# Patient Record
Sex: FEMALE | Race: WHITE | Employment: OTHER | URBAN - METROPOLITAN AREA
[De-identification: names, ages, dates, MRNs, and addresses within clinical notes are randomized per-mention and may not be internally consistent; named-entity substitution may affect disease eponyms.]

---

## 2017-03-21 PROCEDURE — 88175 CYTOPATH C/V AUTO FLUID REDO: CPT | Performed by: INTERNAL MEDICINE

## 2017-06-07 PROBLEM — E66.01 SEVERE OBESITY (BMI 35.0-39.9) WITH COMORBIDITY (HCC): Status: ACTIVE | Noted: 2017-06-07

## 2018-02-08 PROBLEM — E66.01 SEVERE OBESITY (BMI 35.0-39.9) WITH COMORBIDITY (HCC): Status: RESOLVED | Noted: 2017-06-07 | Resolved: 2018-02-08

## 2018-05-25 PROCEDURE — 88305 TISSUE EXAM BY PATHOLOGIST: CPT | Performed by: INTERNAL MEDICINE

## 2018-06-26 PROCEDURE — 87045 FECES CULTURE AEROBIC BACT: CPT | Performed by: INTERNAL MEDICINE

## 2018-06-26 PROCEDURE — 87427 SHIGA-LIKE TOXIN AG IA: CPT | Performed by: INTERNAL MEDICINE

## 2018-06-26 PROCEDURE — 87329 GIARDIA AG IA: CPT | Performed by: INTERNAL MEDICINE

## 2018-06-26 PROCEDURE — 87177 OVA AND PARASITES SMEARS: CPT | Performed by: INTERNAL MEDICINE

## 2018-06-26 PROCEDURE — 87209 SMEAR COMPLEX STAIN: CPT | Performed by: INTERNAL MEDICINE

## 2018-06-26 PROCEDURE — 87272 CRYPTOSPORIDIUM AG IF: CPT | Performed by: INTERNAL MEDICINE

## 2018-06-26 PROCEDURE — 87046 STOOL CULTR AEROBIC BACT EA: CPT | Performed by: INTERNAL MEDICINE

## 2018-07-03 PROCEDURE — 82705 FATS/LIPIDS FECES QUAL: CPT | Performed by: INTERNAL MEDICINE

## 2018-07-03 PROCEDURE — 87493 C DIFF AMPLIFIED PROBE: CPT | Performed by: INTERNAL MEDICINE

## 2018-07-03 PROCEDURE — 83993 ASSAY FOR CALPROTECTIN FECAL: CPT | Performed by: INTERNAL MEDICINE

## 2018-07-03 PROCEDURE — 82656 EL-1 FECAL QUAL/SEMIQ: CPT | Performed by: INTERNAL MEDICINE

## 2018-08-07 PROCEDURE — 83516 IMMUNOASSAY NONANTIBODY: CPT | Performed by: INTERNAL MEDICINE

## 2018-08-07 PROCEDURE — 82784 ASSAY IGA/IGD/IGG/IGM EACH: CPT | Performed by: INTERNAL MEDICINE

## 2018-08-07 PROCEDURE — 86256 FLUORESCENT ANTIBODY TITER: CPT | Performed by: INTERNAL MEDICINE

## 2018-08-07 PROCEDURE — 82607 VITAMIN B-12: CPT | Performed by: INTERNAL MEDICINE

## 2018-08-08 PROCEDURE — 87329 GIARDIA AG IA: CPT | Performed by: INTERNAL MEDICINE

## 2018-08-08 PROCEDURE — 87015 SPECIMEN INFECT AGNT CONCNTJ: CPT | Performed by: INTERNAL MEDICINE

## 2018-08-08 PROCEDURE — 87207 SMEAR SPECIAL STAIN: CPT | Performed by: INTERNAL MEDICINE

## 2018-08-08 PROCEDURE — 87177 OVA AND PARASITES SMEARS: CPT | Performed by: INTERNAL MEDICINE

## 2018-08-08 PROCEDURE — 36415 COLL VENOUS BLD VENIPUNCTURE: CPT | Performed by: INTERNAL MEDICINE

## 2018-08-08 PROCEDURE — 87272 CRYPTOSPORIDIUM AG IF: CPT | Performed by: INTERNAL MEDICINE

## 2018-08-08 PROCEDURE — 82705 FATS/LIPIDS FECES QUAL: CPT | Performed by: INTERNAL MEDICINE

## 2018-08-08 PROCEDURE — 87209 SMEAR COMPLEX STAIN: CPT | Performed by: INTERNAL MEDICINE

## 2018-08-08 PROCEDURE — 82656 EL-1 FECAL QUAL/SEMIQ: CPT | Performed by: INTERNAL MEDICINE

## 2018-09-08 PROCEDURE — 88305 TISSUE EXAM BY PATHOLOGIST: CPT | Performed by: INTERNAL MEDICINE

## 2018-10-18 PROBLEM — K52.831 COLLAGENOUS COLITIS: Status: ACTIVE | Noted: 2018-10-18

## 2019-01-14 PROCEDURE — 87086 URINE CULTURE/COLONY COUNT: CPT | Performed by: EMERGENCY MEDICINE

## 2019-02-14 PROBLEM — I70.0 AORTIC ATHEROSCLEROSIS (HCC): Status: ACTIVE | Noted: 2019-02-14

## 2020-08-27 NOTE — H&P
[facility]  Orthopedic Surgery  HISTORY AND PHYSICAL EXAMINATION    Patient: [address] [facility] Record Number: [id number]    CHIEF COMPLAINT: Right knee pain    HPI:   Cely Joey is a 77year old female followed in the office, started with disabi weight  MUSCULOSKELETAL: no atrophy, no spasticity and as per HPI  SKIN: denies any unusual skin lesions or rashes  NEURO: denies numbness, tingling, or radiating pain  RESPIRATORY: No shortness of breath.    PSYCHIATRY: No depression or anxiety  All other

## 2020-09-03 NOTE — H&P
[facility]  Orthopedic Surgery  HISTORY AND PHYSICAL EXAMINATION    Patient: 3000 [facility] Record Number: [id number]    CHIEF COMPLAINT: Right knee pain    HPI:   Lucita is a 77year old female followed in the office, struggle with disab 2016        Years since quittin.4      Smokeless tobacco: Never Used      Tobacco comment: 5-10 cig/day, quit 2016    Alcohol use: No      Alcohol/week: 0.0 standard drinks    Drug use: No    Family History:  Family History   Problem Relation Age not a surgical procedure. Anya Gaffney MD

## 2020-09-05 ENCOUNTER — APPOINTMENT (OUTPATIENT)
Dept: LAB | Facility: HOSPITAL | Age: 75
End: 2020-09-05
Attending: ORTHOPAEDIC SURGERY
Payer: MEDICARE

## 2020-09-05 DIAGNOSIS — M17.11 PRIMARY OSTEOARTHRITIS OF RIGHT KNEE: ICD-10-CM

## 2020-09-07 LAB — SARS-COV-2 RNA RESP QL NAA+PROBE: NOT DETECTED

## 2020-09-08 ENCOUNTER — HOSPITAL ENCOUNTER (OUTPATIENT)
Facility: HOSPITAL | Age: 75
Discharge: HOME HEALTH | End: 2020-09-09
Attending: ORTHOPAEDIC SURGERY | Admitting: ORTHOPAEDIC SURGERY
Payer: MEDICARE

## 2020-09-08 ENCOUNTER — ANESTHESIA (OUTPATIENT)
Dept: SURGERY | Facility: HOSPITAL | Age: 75
End: 2020-09-08
Payer: MEDICARE

## 2020-09-08 ENCOUNTER — APPOINTMENT (OUTPATIENT)
Dept: GENERAL RADIOLOGY | Facility: HOSPITAL | Age: 75
End: 2020-09-08
Attending: ORTHOPAEDIC SURGERY
Payer: MEDICARE

## 2020-09-08 ENCOUNTER — ANESTHESIA EVENT (OUTPATIENT)
Dept: SURGERY | Facility: HOSPITAL | Age: 75
End: 2020-09-08
Payer: MEDICARE

## 2020-09-08 DIAGNOSIS — M17.11 PRIMARY OSTEOARTHRITIS OF RIGHT KNEE: Primary | ICD-10-CM

## 2020-09-08 PROBLEM — Z47.89 ORTHOPEDIC AFTERCARE: Status: ACTIVE | Noted: 2020-09-08

## 2020-09-08 LAB
ANTIBODY SCREEN: NEGATIVE
CREAT BLD-MCNC: 0.96 MG/DL (ref 0.55–1.02)
RH BLOOD TYPE: POSITIVE

## 2020-09-08 PROCEDURE — 86850 RBC ANTIBODY SCREEN: CPT

## 2020-09-08 PROCEDURE — 0SRC069 REPLACEMENT OF RIGHT KNEE JOINT WITH OXIDIZED ZIRCONIUM ON POLYETHYLENE SYNTHETIC SUBSTITUTE, CEMENTED, OPEN APPROACH: ICD-10-PCS | Performed by: ORTHOPAEDIC SURGERY

## 2020-09-08 PROCEDURE — 88305 TISSUE EXAM BY PATHOLOGIST: CPT | Performed by: ORTHOPAEDIC SURGERY

## 2020-09-08 PROCEDURE — 88311 DECALCIFY TISSUE: CPT | Performed by: ORTHOPAEDIC SURGERY

## 2020-09-08 PROCEDURE — 76942 ECHO GUIDE FOR BIOPSY: CPT | Performed by: ANESTHESIOLOGY

## 2020-09-08 PROCEDURE — 86900 BLOOD TYPING SEROLOGIC ABO: CPT

## 2020-09-08 PROCEDURE — 73560 X-RAY EXAM OF KNEE 1 OR 2: CPT | Performed by: ORTHOPAEDIC SURGERY

## 2020-09-08 PROCEDURE — 82565 ASSAY OF CREATININE: CPT | Performed by: ORTHOPAEDIC SURGERY

## 2020-09-08 PROCEDURE — 86901 BLOOD TYPING SEROLOGIC RH(D): CPT

## 2020-09-08 DEVICE — REFOBACIN BC R 1X40 US: Type: IMPLANTABLE DEVICE | Site: KNEE | Status: FUNCTIONAL

## 2020-09-08 DEVICE — PSN ALL POLY PAT PLY 32MM: Type: IMPLANTABLE DEVICE | Site: KNEE | Status: FUNCTIONAL

## 2020-09-08 DEVICE — PSN STR HYB ST 14X+30 M: Type: IMPLANTABLE DEVICE | Site: KNEE | Status: FUNCTIONAL

## 2020-09-08 DEVICE — PSN TIB STM 5 DEG SZ E R: Type: IMPLANTABLE DEVICE | Site: KNEE | Status: FUNCTIONAL

## 2020-09-08 DEVICE — PSNA CM FM/CM TIB/VE: Type: IMPLANTABLE DEVICE | Status: FUNCTIONAL

## 2020-09-08 RX ORDER — OXYCODONE HYDROCHLORIDE 15 MG/1
15 TABLET ORAL EVERY 4 HOURS PRN
Status: DISCONTINUED | OUTPATIENT
Start: 2020-09-08 | End: 2020-09-09

## 2020-09-08 RX ORDER — MELATONIN
325
Status: DISCONTINUED | OUTPATIENT
Start: 2020-09-09 | End: 2020-09-09

## 2020-09-08 RX ORDER — OXYCODONE HYDROCHLORIDE 10 MG/1
10 TABLET ORAL EVERY 4 HOURS PRN
Status: DISCONTINUED | OUTPATIENT
Start: 2020-09-08 | End: 2020-09-09

## 2020-09-08 RX ORDER — TIZANIDINE 2 MG/1
2 TABLET ORAL 3 TIMES DAILY PRN
Status: DISCONTINUED | OUTPATIENT
Start: 2020-09-08 | End: 2020-09-09

## 2020-09-08 RX ORDER — PROCHLORPERAZINE EDISYLATE 5 MG/ML
10 INJECTION INTRAMUSCULAR; INTRAVENOUS EVERY 6 HOURS PRN
Status: DISCONTINUED | OUTPATIENT
Start: 2020-09-08 | End: 2020-09-09

## 2020-09-08 RX ORDER — DIPHENHYDRAMINE HYDROCHLORIDE 50 MG/ML
25 INJECTION INTRAMUSCULAR; INTRAVENOUS ONCE AS NEEDED
Status: ACTIVE | OUTPATIENT
Start: 2020-09-08 | End: 2020-09-08

## 2020-09-08 RX ORDER — DIPHENHYDRAMINE HYDROCHLORIDE 50 MG/ML
12.5 INJECTION INTRAMUSCULAR; INTRAVENOUS EVERY 4 HOURS PRN
Status: DISCONTINUED | OUTPATIENT
Start: 2020-09-08 | End: 2020-09-09

## 2020-09-08 RX ORDER — METOCLOPRAMIDE HYDROCHLORIDE 5 MG/ML
10 INJECTION INTRAMUSCULAR; INTRAVENOUS EVERY 6 HOURS PRN
Status: DISCONTINUED | OUTPATIENT
Start: 2020-09-08 | End: 2020-09-09

## 2020-09-08 RX ORDER — HYDROMORPHONE HYDROCHLORIDE 1 MG/ML
0.4 INJECTION, SOLUTION INTRAMUSCULAR; INTRAVENOUS; SUBCUTANEOUS EVERY 2 HOUR PRN
Status: DISCONTINUED | OUTPATIENT
Start: 2020-09-08 | End: 2020-09-09

## 2020-09-08 RX ORDER — TRANEXAMIC ACID 10 MG/ML
1000 INJECTION, SOLUTION INTRAVENOUS ONCE
Status: COMPLETED | OUTPATIENT
Start: 2020-09-08 | End: 2020-09-08

## 2020-09-08 RX ORDER — ACETAMINOPHEN 325 MG/1
TABLET ORAL
Status: COMPLETED
Start: 2020-09-08 | End: 2020-09-08

## 2020-09-08 RX ORDER — BUPRENORPHINE HYDROCHLORIDE 0.32 MG/ML
INJECTION INTRAMUSCULAR; INTRAVENOUS AS NEEDED
Status: DISCONTINUED | OUTPATIENT
Start: 2020-09-08 | End: 2020-09-08 | Stop reason: SURG

## 2020-09-08 RX ORDER — CEFAZOLIN SODIUM/WATER 2 G/20 ML
2 SYRINGE (ML) INTRAVENOUS EVERY 8 HOURS
Status: COMPLETED | OUTPATIENT
Start: 2020-09-08 | End: 2020-09-09

## 2020-09-08 RX ORDER — DOCUSATE SODIUM 100 MG/1
100 CAPSULE, LIQUID FILLED ORAL 2 TIMES DAILY
Status: DISCONTINUED | OUTPATIENT
Start: 2020-09-08 | End: 2020-09-09

## 2020-09-08 RX ORDER — ONDANSETRON 2 MG/ML
4 INJECTION INTRAMUSCULAR; INTRAVENOUS EVERY 4 HOURS PRN
Status: DISCONTINUED | OUTPATIENT
Start: 2020-09-08 | End: 2020-09-09

## 2020-09-08 RX ORDER — LIDOCAINE HYDROCHLORIDE 10 MG/ML
INJECTION, SOLUTION EPIDURAL; INFILTRATION; INTRACAUDAL; PERINEURAL AS NEEDED
Status: DISCONTINUED | OUTPATIENT
Start: 2020-09-08 | End: 2020-09-08 | Stop reason: SURG

## 2020-09-08 RX ORDER — HYDROMORPHONE HYDROCHLORIDE 1 MG/ML
0.4 INJECTION, SOLUTION INTRAMUSCULAR; INTRAVENOUS; SUBCUTANEOUS EVERY 5 MIN PRN
Status: DISCONTINUED | OUTPATIENT
Start: 2020-09-08 | End: 2020-09-08 | Stop reason: HOSPADM

## 2020-09-08 RX ORDER — ACETAMINOPHEN 500 MG
1000 TABLET ORAL ONCE
Status: ON HOLD | COMMUNITY
End: 2021-01-08

## 2020-09-08 RX ORDER — BUPIVACAINE HYDROCHLORIDE 7.5 MG/ML
INJECTION, SOLUTION INTRASPINAL AS NEEDED
Status: DISCONTINUED | OUTPATIENT
Start: 2020-09-08 | End: 2020-09-08 | Stop reason: SURG

## 2020-09-08 RX ORDER — OXYCODONE HYDROCHLORIDE 5 MG/1
5 TABLET ORAL EVERY 4 HOURS PRN
Status: DISCONTINUED | OUTPATIENT
Start: 2020-09-08 | End: 2020-09-09

## 2020-09-08 RX ORDER — ACETAMINOPHEN 500 MG
1000 TABLET ORAL ONCE
Status: DISCONTINUED | OUTPATIENT
Start: 2020-09-08 | End: 2020-09-08 | Stop reason: HOSPADM

## 2020-09-08 RX ORDER — BISACODYL 10 MG
10 SUPPOSITORY, RECTAL RECTAL
Status: DISCONTINUED | OUTPATIENT
Start: 2020-09-08 | End: 2020-09-09

## 2020-09-08 RX ORDER — NALOXONE HYDROCHLORIDE 0.4 MG/ML
80 INJECTION, SOLUTION INTRAMUSCULAR; INTRAVENOUS; SUBCUTANEOUS AS NEEDED
Status: DISCONTINUED | OUTPATIENT
Start: 2020-09-08 | End: 2020-09-08 | Stop reason: HOSPADM

## 2020-09-08 RX ORDER — CLONIDINE 100 UG/ML
INJECTION, SOLUTION EPIDURAL AS NEEDED
Status: DISCONTINUED | OUTPATIENT
Start: 2020-09-08 | End: 2020-09-08 | Stop reason: SURG

## 2020-09-08 RX ORDER — DEXAMETHASONE SODIUM PHOSPHATE 10 MG/ML
8 INJECTION, SOLUTION INTRAMUSCULAR; INTRAVENOUS ONCE
Status: COMPLETED | OUTPATIENT
Start: 2020-09-09 | End: 2020-09-09

## 2020-09-08 RX ORDER — HYDROMORPHONE HYDROCHLORIDE 1 MG/ML
0.8 INJECTION, SOLUTION INTRAMUSCULAR; INTRAVENOUS; SUBCUTANEOUS EVERY 2 HOUR PRN
Status: DISCONTINUED | OUTPATIENT
Start: 2020-09-08 | End: 2020-09-09

## 2020-09-08 RX ORDER — DIPHENHYDRAMINE HCL 25 MG
25 CAPSULE ORAL EVERY 4 HOURS PRN
Status: DISCONTINUED | OUTPATIENT
Start: 2020-09-08 | End: 2020-09-09

## 2020-09-08 RX ORDER — ASPIRIN 325 MG
325 TABLET ORAL 2 TIMES DAILY
Status: DISCONTINUED | OUTPATIENT
Start: 2020-09-08 | End: 2020-09-09

## 2020-09-08 RX ORDER — SODIUM CHLORIDE, SODIUM LACTATE, POTASSIUM CHLORIDE, CALCIUM CHLORIDE 600; 310; 30; 20 MG/100ML; MG/100ML; MG/100ML; MG/100ML
INJECTION, SOLUTION INTRAVENOUS CONTINUOUS
Status: DISCONTINUED | OUTPATIENT
Start: 2020-09-08 | End: 2020-09-08 | Stop reason: HOSPADM

## 2020-09-08 RX ORDER — CEFAZOLIN SODIUM/WATER 2 G/20 ML
SYRINGE (ML) INTRAVENOUS
Status: DISPENSED
Start: 2020-09-08 | End: 2020-09-08

## 2020-09-08 RX ORDER — MIDAZOLAM HYDROCHLORIDE 1 MG/ML
INJECTION INTRAMUSCULAR; INTRAVENOUS AS NEEDED
Status: DISCONTINUED | OUTPATIENT
Start: 2020-09-08 | End: 2020-09-08 | Stop reason: SURG

## 2020-09-08 RX ORDER — TRANEXAMIC ACID 10 MG/ML
1000 INJECTION, SOLUTION INTRAVENOUS ONCE
Status: DISCONTINUED | OUTPATIENT
Start: 2020-09-08 | End: 2020-09-08 | Stop reason: HOSPADM

## 2020-09-08 RX ORDER — HYDROMORPHONE HYDROCHLORIDE 1 MG/ML
0.2 INJECTION, SOLUTION INTRAMUSCULAR; INTRAVENOUS; SUBCUTANEOUS EVERY 2 HOUR PRN
Status: DISCONTINUED | OUTPATIENT
Start: 2020-09-08 | End: 2020-09-09

## 2020-09-08 RX ORDER — POLYETHYLENE GLYCOL 3350 17 G/17G
17 POWDER, FOR SOLUTION ORAL DAILY PRN
Status: DISCONTINUED | OUTPATIENT
Start: 2020-09-08 | End: 2020-09-09

## 2020-09-08 RX ORDER — SODIUM PHOSPHATE, DIBASIC AND SODIUM PHOSPHATE, MONOBASIC 7; 19 G/133ML; G/133ML
1 ENEMA RECTAL ONCE AS NEEDED
Status: DISCONTINUED | OUTPATIENT
Start: 2020-09-08 | End: 2020-09-09

## 2020-09-08 RX ORDER — SENNOSIDES 8.6 MG
17.2 TABLET ORAL NIGHTLY
Status: DISCONTINUED | OUTPATIENT
Start: 2020-09-08 | End: 2020-09-09

## 2020-09-08 RX ORDER — ACETAMINOPHEN 325 MG/1
650 TABLET ORAL ONCE
Status: COMPLETED | OUTPATIENT
Start: 2020-09-08 | End: 2020-09-08

## 2020-09-08 RX ORDER — DEXAMETHASONE SODIUM PHOSPHATE 4 MG/ML
VIAL (ML) INJECTION AS NEEDED
Status: DISCONTINUED | OUTPATIENT
Start: 2020-09-08 | End: 2020-09-08 | Stop reason: SURG

## 2020-09-08 RX ORDER — ONDANSETRON 2 MG/ML
INJECTION INTRAMUSCULAR; INTRAVENOUS AS NEEDED
Status: DISCONTINUED | OUTPATIENT
Start: 2020-09-08 | End: 2020-09-08 | Stop reason: SURG

## 2020-09-08 RX ORDER — KETOROLAC TROMETHAMINE 15 MG/ML
15 INJECTION, SOLUTION INTRAMUSCULAR; INTRAVENOUS EVERY 6 HOURS
Status: COMPLETED | OUTPATIENT
Start: 2020-09-08 | End: 2020-09-09

## 2020-09-08 RX ORDER — ATORVASTATIN CALCIUM 20 MG/1
20 TABLET, FILM COATED ORAL DAILY
Status: DISCONTINUED | OUTPATIENT
Start: 2020-09-09 | End: 2020-09-09

## 2020-09-08 RX ORDER — ONDANSETRON 2 MG/ML
4 INJECTION INTRAMUSCULAR; INTRAVENOUS AS NEEDED
Status: DISCONTINUED | OUTPATIENT
Start: 2020-09-08 | End: 2020-09-08 | Stop reason: HOSPADM

## 2020-09-08 RX ORDER — ZOLPIDEM TARTRATE 5 MG/1
5 TABLET ORAL NIGHTLY PRN
Status: DISCONTINUED | OUTPATIENT
Start: 2020-09-08 | End: 2020-09-09

## 2020-09-08 RX ORDER — ACETAMINOPHEN 500 MG
1000 TABLET ORAL 4 TIMES DAILY
Status: DISCONTINUED | OUTPATIENT
Start: 2020-09-08 | End: 2020-09-09

## 2020-09-08 RX ORDER — METOCLOPRAMIDE HYDROCHLORIDE 5 MG/ML
10 INJECTION INTRAMUSCULAR; INTRAVENOUS AS NEEDED
Status: DISCONTINUED | OUTPATIENT
Start: 2020-09-08 | End: 2020-09-08 | Stop reason: HOSPADM

## 2020-09-08 RX ORDER — KETOROLAC TROMETHAMINE 30 MG/ML
INJECTION, SOLUTION INTRAMUSCULAR; INTRAVENOUS AS NEEDED
Status: DISCONTINUED | OUTPATIENT
Start: 2020-09-08 | End: 2020-09-08 | Stop reason: SURG

## 2020-09-08 RX ORDER — DEXAMETHASONE SODIUM PHOSPHATE 10 MG/ML
INJECTION, SOLUTION INTRAMUSCULAR; INTRAVENOUS AS NEEDED
Status: DISCONTINUED | OUTPATIENT
Start: 2020-09-08 | End: 2020-09-08 | Stop reason: SURG

## 2020-09-08 RX ORDER — EPHEDRINE SULFATE 50 MG/ML
INJECTION, SOLUTION INTRAVENOUS AS NEEDED
Status: DISCONTINUED | OUTPATIENT
Start: 2020-09-08 | End: 2020-09-08 | Stop reason: SURG

## 2020-09-08 RX ORDER — SODIUM CHLORIDE 9 MG/ML
INJECTION, SOLUTION INTRAVENOUS CONTINUOUS
Status: DISCONTINUED | OUTPATIENT
Start: 2020-09-08 | End: 2020-09-09

## 2020-09-08 RX ORDER — KETAMINE HYDROCHLORIDE 50 MG/ML
INJECTION, SOLUTION, CONCENTRATE INTRAMUSCULAR; INTRAVENOUS AS NEEDED
Status: DISCONTINUED | OUTPATIENT
Start: 2020-09-08 | End: 2020-09-08 | Stop reason: SURG

## 2020-09-08 RX ORDER — CEFAZOLIN SODIUM/WATER 2 G/20 ML
2 SYRINGE (ML) INTRAVENOUS ONCE
Status: COMPLETED | OUTPATIENT
Start: 2020-09-08 | End: 2020-09-08

## 2020-09-08 RX ADMIN — KETAMINE HYDROCHLORIDE 20 MG: 50 INJECTION, SOLUTION, CONCENTRATE INTRAMUSCULAR; INTRAVENOUS at 12:20:00

## 2020-09-08 RX ADMIN — CLONIDINE 50 MCG: 100 INJECTION, SOLUTION EPIDURAL at 12:02:00

## 2020-09-08 RX ADMIN — KETOROLAC TROMETHAMINE 15 MG: 30 INJECTION, SOLUTION INTRAMUSCULAR; INTRAVENOUS at 13:20:00

## 2020-09-08 RX ADMIN — SODIUM CHLORIDE, SODIUM LACTATE, POTASSIUM CHLORIDE, CALCIUM CHLORIDE: 600; 310; 30; 20 INJECTION, SOLUTION INTRAVENOUS at 11:34:00

## 2020-09-08 RX ADMIN — DEXAMETHASONE SODIUM PHOSPHATE 2 MG: 10 INJECTION, SOLUTION INTRAMUSCULAR; INTRAVENOUS at 12:02:00

## 2020-09-08 RX ADMIN — EPHEDRINE SULFATE 10 MG: 50 INJECTION, SOLUTION INTRAVENOUS at 12:14:00

## 2020-09-08 RX ADMIN — DEXAMETHASONE SODIUM PHOSPHATE 8 MG: 4 MG/ML VIAL (ML) INJECTION at 12:05:00

## 2020-09-08 RX ADMIN — ONDANSETRON 4 MG: 2 INJECTION INTRAMUSCULAR; INTRAVENOUS at 13:18:00

## 2020-09-08 RX ADMIN — EPHEDRINE SULFATE 10 MG: 50 INJECTION, SOLUTION INTRAVENOUS at 12:52:00

## 2020-09-08 RX ADMIN — EPHEDRINE SULFATE 10 MG: 50 INJECTION, SOLUTION INTRAVENOUS at 12:33:00

## 2020-09-08 RX ADMIN — CEFAZOLIN SODIUM/WATER 2 G: 2 G/20 ML SYRINGE (ML) INTRAVENOUS at 12:05:00

## 2020-09-08 RX ADMIN — BUPIVACAINE HYDROCHLORIDE 1.7 ML: 7.5 INJECTION, SOLUTION INTRASPINAL at 11:57:00

## 2020-09-08 RX ADMIN — LIDOCAINE HYDROCHLORIDE 50 MG: 10 INJECTION, SOLUTION EPIDURAL; INFILTRATION; INTRACAUDAL; PERINEURAL at 12:05:00

## 2020-09-08 RX ADMIN — SODIUM CHLORIDE, SODIUM LACTATE, POTASSIUM CHLORIDE, CALCIUM CHLORIDE: 600; 310; 30; 20 INJECTION, SOLUTION INTRAVENOUS at 12:25:00

## 2020-09-08 RX ADMIN — MIDAZOLAM HYDROCHLORIDE 2 MG: 1 INJECTION INTRAMUSCULAR; INTRAVENOUS at 11:41:00

## 2020-09-08 RX ADMIN — BUPRENORPHINE HYDROCHLORIDE 150 MCG: 0.32 INJECTION INTRAMUSCULAR; INTRAVENOUS at 12:02:00

## 2020-09-08 NOTE — ANESTHESIA PREPROCEDURE EVALUATION
PRE-OP EVALUATION    Patient Name: Tate Kumar    Pre-op Diagnosis: Primary osteoarthritis of right knee [M17.11]    Procedure(s):  RIGHT TOTAL KNEE ARTHROPLASTY    Surgeon(s) and Role:     * Maria Villareal MD - Primary    Pre-op vitals reviewed.   Te Endo/Other                           (+) arthritis       Pulmonary        (+) COPD and mild  COPD not requiring home oxygen. Neuro/Psych    Negative neuro/psych ROS.                                 Past Surgical History:   Procedure Tiny Moh MCH 29.1 09/03/2020    MCHC 32.3 09/03/2020    RDW 13.5 09/03/2020     09/03/2020     Lab Results   Component Value Date     09/03/2020    K 4.20 09/03/2020    CL 98 09/03/2020    CO2 28.8 09/03/2020    BUN 12.0 09/03/2020    CREATSERUM 0.86

## 2020-09-08 NOTE — ANESTHESIA PROCEDURE NOTES
[facility]  Performed by: Keerthi Bland MD  Authorized by: Keerthi Bland MD       General Information and Staff    Start Time:  9/8/2020 12:00 PM  End Time:  9/8/2020 12:03 PM  Anesthesiologist:  Keerthi Bland MD  Performed by:   Anesthesiologist  Jessy

## 2020-09-08 NOTE — ADDENDUM NOTE
Addendum  created 09/08/20 1533 by Nae Caputo MD    Clinical Note Signed, Intraprocedure Blocks edited, Intraprocedure Meds edited

## 2020-09-08 NOTE — INTERVAL H&P NOTE
Pre-op Diagnosis: Primary osteoarthritis of right knee [M17.11]    The above referenced H&P was reviewed by Renae Anglin MD on 9/8/2020, the patient was examined and no significant changes have occurred in the patient's condition since the H&P was perf

## 2020-09-08 NOTE — PLAN OF CARE
Post op plan of care discussed with patient at bedside. Goals discussed. Rt knee drsg clean and dry. Gel ice in place. Scd's on. Vitals stable. Cpm at bedside. Will monitor. Rn paged [facility] hospitalist for new consult for medical management.

## 2020-09-08 NOTE — CONSULTS
[facility] hospitalist initial consult note    PCP Gabriel Colmenares MD  Consulted at the request of Dr. Micaela Fitch  Reason for consult medical co-management    HPI 76 yo female with multiple medical problems including but not limited to HTN, HL, endometrial cance for endometrial cancer   • OTHER SURGICAL HISTORY  1962    pilonidal cyst   • OTHER SURGICAL HISTORY  6-28-12    flow/us/cysto-Dr. Enedina Tripathi     Family History   Problem Relation Age of Onset   • Cancer Mother         fallopian tube   • Diabetes Brother    • P Other Topics      Concerns:        Not on file    Social History Narrative      , no children. Retired . Exercising with water aerobics. 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      All   Ciprofloxacin               Comment:rash  Med

## 2020-09-08 NOTE — ANESTHESIA POSTPROCEDURE EVALUATION
3 [facility] Patient Status:  Outpatient in a Bed   Age/Gender 77year old female MRN [id number]   [facility] SURGERY Attending Merlinda Lake, MD   Hosp Day # 0 PCP Neelima Jhaveri MD       Anesthesia Post-op Note

## 2020-09-09 VITALS
DIASTOLIC BLOOD PRESSURE: 62 MMHG | HEART RATE: 67 BPM | RESPIRATION RATE: 18 BRPM | SYSTOLIC BLOOD PRESSURE: 136 MMHG | HEIGHT: 66.5 IN | OXYGEN SATURATION: 95 % | TEMPERATURE: 98 F | WEIGHT: 224.44 LBS | BODY MASS INDEX: 35.64 KG/M2

## 2020-09-09 LAB
ANION GAP SERPL CALC-SCNC: 5 MMOL/L (ref 0–18)
BUN BLD-MCNC: 17 MG/DL (ref 7–18)
BUN/CREAT SERPL: 19.8 (ref 10–20)
CALCIUM BLD-MCNC: 8.7 MG/DL (ref 8.5–10.1)
CHLORIDE SERPL-SCNC: 101 MMOL/L (ref 98–112)
CO2 SERPL-SCNC: 27 MMOL/L (ref 21–32)
CREAT BLD-MCNC: 0.86 MG/DL (ref 0.55–1.02)
EST. AVERAGE GLUCOSE BLD GHB EST-MCNC: 143 MG/DL (ref 68–126)
GLUCOSE BLD-MCNC: 155 MG/DL (ref 70–99)
HBA1C MFR BLD HPLC: 6.6 % (ref ?–5.7)
OSMOLALITY SERPL CALC.SUM OF ELEC: 281 MOSM/KG (ref 275–295)
POTASSIUM SERPL-SCNC: 4 MMOL/L (ref 3.5–5.1)
SODIUM SERPL-SCNC: 133 MMOL/L (ref 136–145)

## 2020-09-09 PROCEDURE — 83036 HEMOGLOBIN GLYCOSYLATED A1C: CPT | Performed by: HOSPITALIST

## 2020-09-09 PROCEDURE — 97161 PT EVAL LOW COMPLEX 20 MIN: CPT

## 2020-09-09 PROCEDURE — 97116 GAIT TRAINING THERAPY: CPT

## 2020-09-09 PROCEDURE — 80048 BASIC METABOLIC PNL TOTAL CA: CPT | Performed by: HOSPITALIST

## 2020-09-09 PROCEDURE — 97165 OT EVAL LOW COMPLEX 30 MIN: CPT

## 2020-09-09 PROCEDURE — 97535 SELF CARE MNGMENT TRAINING: CPT

## 2020-09-09 RX ORDER — HYDROCODONE BITARTRATE AND ACETAMINOPHEN 10; 325 MG/1; MG/1
1 TABLET ORAL EVERY 4 HOURS PRN
Status: DISCONTINUED | OUTPATIENT
Start: 2020-09-09 | End: 2020-09-09

## 2020-09-09 NOTE — CM/SW NOTE
PT recommending HH/PT. Luis Alberto Jameson 33 care with referral.  Liaison will meet with the patient/familyto provide choice, explanation of services, and financial disclosure. CMP has been arranged per Silva Hayward RN.     HOME SITUATION  Type of Home:

## 2020-09-09 NOTE — PROGRESS NOTES
[facility] hospitalist daily note  Patient was seen/examine on 9/9/20    S; no chest pain, no SOB, no abd pain, no nausea  + passing gas and urinating  No bleeding    Medications in Epic    PE    09/09/20  0745   BP: 122/65   Pulse: 75   Resp: 18   Temp: 97.7 °F

## 2020-09-09 NOTE — PHYSICAL THERAPY NOTE
PHYSICAL THERAPY QUICK EVALUATION - INPATIENT    Room Number: 382/382-A  Evaluation Date: 9/9/2020  Presenting Problem: s/p right TKA 9/8/20  Physician Order: PT Eval and Treat    Problem List  Active Problems:    Primary osteoarthritis of right knee SITUATION  Type of Home: House   Home Layout: Two level  Stairs to Enter : 2  Railing: Yes  Stairs to Bedroom: 13  Railing: Yes    Lives With: Spouse  Drives: Yes  Patient Owned Equipment: Rolling walker  Patient Regularly Uses: (glasses for distance)    P Assistance: Modified independent  Distance (ft): 200  Assistive Device: Rolling walker  Pattern: R Flexed knee(step through)     Comment : 4 stairs with railing with supervision    Skilled Therapy Provided: PPE worn by therapist throughout session, surgica all needed DME for home use and appears safe for dc home with spouse and home PT. The AM-PAC '6-Clicks' Inpatient Basic Mobility Short Form was completed and this patient is demonstrating a 11.2% degree of impairment in mobility.  Research supports frank

## 2020-09-09 NOTE — PROGRESS NOTES
[address] [facility]  Orthopedic Surgery  Progress Note    Nonda Fallow Patient Status:  Outpatient in a Bed    1945 MRN [id number]   [facility] 3SW-A Attending Claribel Thorne MD   Hosp Day # 0 PCP Izabella Mendez MD     SUBJECT 3. Continue PT/OT  4. Discharge plannin [address]   5. Continue medical management  6.  Follow up in office with Claribel Thorne MD in 2 weeks      Champ Joseph MD  2020  12:22 PM

## 2020-09-09 NOTE — OPERATIVE REPORT
[address] [place]    PATIENT'S NAME: Cameron Rader   ATTENDING PHYSICIAN: Po Carmichael M.D. OPERATING PHYSICIAN: Po Carmichael M.D.    PATIENT ACCOUNT#:   [de-identified]    LOCATION:  [address] [place] [address]  MEDICAL RECORD #:   [id number]       DATE OF BIRTH: balance. Size E tibia is provisionally placed. Rotation is checked and double checked. The femur is positioned followed by a 10 MC poly. Patellar thickness is 22 mm. After resection, resurfacing with a 32 mm patella, patellar thickness is recreated.

## 2020-09-09 NOTE — PLAN OF CARE
Pain to surgical site minimal. Declined need for prn medication. VSS, spo2 96% on 2l/nc. Uses BSC, min assist on transfer and ambulation. Sensation to RLE remains decreased from the nerve block. Able to lift leg off bed, strong ankle D/P flexion.  Reminded

## 2020-09-09 NOTE — PLAN OF CARE
D/C INSSTRUCTIONS GIVEN TO PT AND SPOUSE BVERY ANXIOUS TO GO HOME. PT STATES\" I WILL NOT WAIT FOR HGB A1C RESULTS.  HAS PAIN MEDS AT HOME FILLED

## 2020-09-09 NOTE — OCCUPATIONAL THERAPY NOTE
OCCUPATIONAL THERAPY QUICK EVALUATION - INPATIENT    Room Number: 382/382-A  Evaluation Date: 9/9/2020     Type of Evaluation: Quick Eval  Presenting Problem: R TKR    Physician Order: IP Consult to Occupational Therapy  Reason for Therapy:  ADL/IADL Dysfu 8/8/12    right ing hernia    • HERNIA SURGERY  6/2013   • HYSTERECTOMY  11/29/2010    JAMES BSO for endometrial cancer   • OTHER SURGICAL HISTORY  1962    pilonidal cyst   • OTHER SURGICAL HISTORY  6-28-12    flow/us/cysto-[facility] ASSESSMENT  Supine to Sit : Supervision  Sit to Stand: Supervision    Skilled Therapy Provided:   PPE: Surgical mask and gloves worn. Educated pt on techniques to safely complete bed mobility; pt able to return demo.  Pt completes bed mobility supine >sit limitation presents during this admission.     Patient was able to achieve the following goals:  Patient able to toilet transfer: with supervision  Patient able to dress lower extremities: with supervision  Patient/Caregiver able to demonstrate safety with

## 2020-09-09 NOTE — PLAN OF CARE
RECD ALERT ,AWAKE, ORIENTED. NO RESP DISTRESS. DENIES CALF PAIN. C/O NUMBNESS TO RLE ABLE TO WIGGLE TOES, PAIN NOT PRESENT YET.SEE MAR. PER P.T. NO NEED KI. CALL LIGHT W/IN REACH TO CALL FOR ALL NEEDS AND ASSISTANCE

## 2020-12-16 ENCOUNTER — OFFICE VISIT (OUTPATIENT)
Dept: HEMATOLOGY/ONCOLOGY | Facility: HOSPITAL | Age: 75
End: 2020-12-16
Attending: THORACIC SURGERY (CARDIOTHORACIC VASCULAR SURGERY)
Payer: MEDICARE

## 2020-12-16 VITALS
WEIGHT: 216.63 LBS | BODY MASS INDEX: 34 KG/M2 | RESPIRATION RATE: 18 BRPM | OXYGEN SATURATION: 97 % | DIASTOLIC BLOOD PRESSURE: 80 MMHG | HEART RATE: 78 BPM | SYSTOLIC BLOOD PRESSURE: 151 MMHG | TEMPERATURE: 97 F

## 2020-12-16 DIAGNOSIS — C34.92 PRIMARY ADENOCARCINOMA OF LEFT LUNG (HCC): Primary | ICD-10-CM

## 2020-12-16 PROCEDURE — 99211 OFF/OP EST MAY X REQ PHY/QHP: CPT

## 2020-12-16 NOTE — CONSULTS
Thoracic Surgery Consult      Name: Cricket Champion   Age: 77year old   Sex: female. MRN: [id number]    Reason for Consultation: left lower lobe lung cancer    Subjective:      Chief Complaint: \" left lower lobe lung cancer.  \"    History of Present Il Rfl: 3      Allergies:    Ciprofloxacin               Comment:rash      Past Medical History:  Past Medical History:   Diagnosis Date   • Abdominal wall hernia 6/6/2013    s/p repair 6/2013   • Collagenous colitis 09/2018   • [facility] 2013 Smoking status: Former Smoker        Packs/day: 1.00        Years: 47.00        Pack years: 52        Quit date: 2016        Years since quittin.7      Smokeless tobacco: Never Used    Alcohol use: No      Alcohol/week: 0.0 standard drinks      Fam supraclavicular or axillary lymphadenopathy is identified. A hypodense right thyroid  lobe nodule measures 2.2 cm (14/18). PLEURA:  The pleural spaces are clear. LUNGS:  The central airways are patent.  There is moderate, upper lobe predominant, centrilob noted.  BODY WALL/OSSEOUS STRUCTURES:  Moderate multilevel degenerative disc disease is noted. No aggressive  osseous lesion is identified.  =====  IMPRESSION:  1.   Soft tissue mass extending along the right vaginal cuff corresponding to abnormal FDG uptak month.  2.  Mildly hypermetabolic right hilar lymph node could be reactive. 3.  Lobulated hypermetabolic focus in the pelvis, as detailed above. This is contiguous with the  bladder could represent a bladder diverticulum.  A soft tissue mass must be exclud 11/5/20: FEV1 88.4%, DLCO 82.6%    Assessment-  Patient's PFTs suggest that she has adequate pulmonary reserve to tolerate a lobar resection. Risks and benefits discussed.      Plan-  -Appropriate pre-operative labs and EKG ordered.  -Flexible bronchoscopy,

## 2020-12-16 NOTE — H&P (VIEW-ONLY)
Thoracic Surgery Consult      Name: Becka Ordonez   Age: 77year old   Sex: female. MRN: [id number]    Reason for Consultation: left lower lobe lung cancer    Subjective:      Chief Complaint: \" left lower lobe lung cancer.  \"    History of Present Il Rfl: 3      Allergies:    Ciprofloxacin               Comment:rash      Past Medical History:  Past Medical History:   Diagnosis Date   • Abdominal wall hernia 6/6/2013    s/p repair 6/2013   • Collagenous colitis 09/2018   • Endometrial cancer ([facility] 2013 Smoking status: Former Smoker        Packs/day: 1.00        Years: 47.00        Pack years: 52        Quit date: 2016        Years since quittin.7      Smokeless tobacco: Never Used    Alcohol use: No      Alcohol/week: 0.0 standard drinks      Fam supraclavicular or axillary lymphadenopathy is identified. A hypodense right thyroid  lobe nodule measures 2.2 cm (14/18). PLEURA:  The pleural spaces are clear. LUNGS:  The central airways are patent.  There is moderate, upper lobe predominant, centrilob noted.  BODY WALL/OSSEOUS STRUCTURES:  Moderate multilevel degenerative disc disease is noted. No aggressive  osseous lesion is identified.  =====  IMPRESSION:  1.   Soft tissue mass extending along the right vaginal cuff corresponding to abnormal FDG uptak month.  2.  Mildly hypermetabolic right hilar lymph node could be reactive. 3.  Lobulated hypermetabolic focus in the pelvis, as detailed above. This is contiguous with the  bladder could represent a bladder diverticulum.  A soft tissue mass must be exclud cancer. PFT 11/5/20: FEV1 88.4%, DLCO 82.6%    Assessment-  Patient's PFTs suggest that she has adequate pulmonary reserve to tolerate a lobar resection. Risks and benefits discussed.      Plan-  -Appropriate pre-operative labs and EKG ordered.  -Flexible

## 2020-12-29 RX ORDER — POTASSIUM CHLORIDE 750 MG/1
10 TABLET, FILM COATED, EXTENDED RELEASE ORAL AS NEEDED
COMMUNITY
End: 2021-08-13

## 2021-01-04 ENCOUNTER — LAB ENCOUNTER (OUTPATIENT)
Dept: LAB | Age: 76
End: 2021-01-04
Attending: THORACIC SURGERY (CARDIOTHORACIC VASCULAR SURGERY)
Payer: MEDICARE

## 2021-01-04 DIAGNOSIS — C34.92 PRIMARY ADENOCARCINOMA OF LEFT LUNG (HCC): ICD-10-CM

## 2021-01-04 DIAGNOSIS — C34.92 ADENOCARCINOMA OF LEFT LUNG (HCC): Primary | ICD-10-CM

## 2021-01-04 DIAGNOSIS — Z01.818 PREOP TESTING: ICD-10-CM

## 2021-01-04 LAB
ANION GAP SERPL CALC-SCNC: 3 MMOL/L (ref 0–18)
ANTIBODY SCREEN: NEGATIVE
BASOPHILS # BLD AUTO: 0.05 X10(3) UL (ref 0–0.2)
BASOPHILS NFR BLD AUTO: 0.6 %
BUN BLD-MCNC: 19 MG/DL (ref 7–18)
BUN/CREAT SERPL: 19.2 (ref 10–20)
CALCIUM BLD-MCNC: 9.2 MG/DL (ref 8.5–10.1)
CHLORIDE SERPL-SCNC: 103 MMOL/L (ref 98–112)
CO2 SERPL-SCNC: 32 MMOL/L (ref 21–32)
CREAT BLD-MCNC: 0.99 MG/DL
DEPRECATED RDW RBC AUTO: 45.1 FL (ref 35.1–46.3)
EOSINOPHIL # BLD AUTO: 0.2 X10(3) UL (ref 0–0.7)
EOSINOPHIL NFR BLD AUTO: 2.4 %
ERYTHROCYTE [DISTWIDTH] IN BLOOD BY AUTOMATED COUNT: 13.4 % (ref 11–15)
GLUCOSE BLD-MCNC: 91 MG/DL (ref 70–99)
HCT VFR BLD AUTO: 39.8 %
HGB BLD-MCNC: 12.9 G/DL
IMM GRANULOCYTES # BLD AUTO: 0.03 X10(3) UL (ref 0–1)
IMM GRANULOCYTES NFR BLD: 0.4 %
LYMPHOCYTES # BLD AUTO: 1.73 X10(3) UL (ref 1–4)
LYMPHOCYTES NFR BLD AUTO: 20.7 %
MCH RBC QN AUTO: 29.7 PG (ref 26–34)
MCHC RBC AUTO-ENTMCNC: 32.4 G/DL (ref 31–37)
MCV RBC AUTO: 91.7 FL
MONOCYTES # BLD AUTO: 0.71 X10(3) UL (ref 0.1–1)
MONOCYTES NFR BLD AUTO: 8.5 %
NEUTROPHILS # BLD AUTO: 5.63 X10 (3) UL (ref 1.5–7.7)
NEUTROPHILS # BLD AUTO: 5.63 X10(3) UL (ref 1.5–7.7)
NEUTROPHILS NFR BLD AUTO: 67.4 %
OSMOLALITY SERPL CALC.SUM OF ELEC: 288 MOSM/KG (ref 275–295)
PATIENT FASTING Y/N/NP: NO
PLATELET # BLD AUTO: 211 10(3)UL (ref 150–450)
POTASSIUM SERPL-SCNC: 4.2 MMOL/L (ref 3.5–5.1)
RBC # BLD AUTO: 4.34 X10(6)UL
RH BLOOD TYPE: POSITIVE
SODIUM SERPL-SCNC: 138 MMOL/L (ref 136–145)
WBC # BLD AUTO: 8.4 X10(3) UL (ref 4–11)

## 2021-01-04 PROCEDURE — 86850 RBC ANTIBODY SCREEN: CPT

## 2021-01-04 PROCEDURE — 86900 BLOOD TYPING SEROLOGIC ABO: CPT

## 2021-01-04 PROCEDURE — 86901 BLOOD TYPING SEROLOGIC RH(D): CPT

## 2021-01-04 PROCEDURE — 36415 COLL VENOUS BLD VENIPUNCTURE: CPT

## 2021-01-04 PROCEDURE — 93010 ELECTROCARDIOGRAM REPORT: CPT | Performed by: THORACIC SURGERY (CARDIOTHORACIC VASCULAR SURGERY)

## 2021-01-04 PROCEDURE — 93005 ELECTROCARDIOGRAM TRACING: CPT

## 2021-01-04 PROCEDURE — 80048 BASIC METABOLIC PNL TOTAL CA: CPT

## 2021-01-04 PROCEDURE — 85025 COMPLETE CBC W/AUTO DIFF WBC: CPT

## 2021-01-05 ENCOUNTER — ANESTHESIA EVENT (OUTPATIENT)
Dept: CARDIAC SURGERY | Facility: HOSPITAL | Age: 76
DRG: 165 | End: 2021-01-05
Payer: MEDICARE

## 2021-01-05 LAB — SARS-COV-2 RNA RESP QL NAA+PROBE: NOT DETECTED

## 2021-01-06 ENCOUNTER — ANESTHESIA (OUTPATIENT)
Dept: CARDIAC SURGERY | Facility: HOSPITAL | Age: 76
DRG: 165 | End: 2021-01-06
Payer: MEDICARE

## 2021-01-06 ENCOUNTER — HOSPITAL ENCOUNTER (INPATIENT)
Facility: HOSPITAL | Age: 76
LOS: 2 days | Discharge: HOME | DRG: 165 | End: 2021-01-08
Attending: THORACIC SURGERY (CARDIOTHORACIC VASCULAR SURGERY) | Admitting: THORACIC SURGERY (CARDIOTHORACIC VASCULAR SURGERY)
Payer: MEDICARE

## 2021-01-06 DIAGNOSIS — Z01.818 PREOP TESTING: Primary | ICD-10-CM

## 2021-01-06 DIAGNOSIS — C34.90 ADENOCARCINOMA OF LUNG (HCC): ICD-10-CM

## 2021-01-06 LAB
GLUCOSE BLD-MCNC: 108 MG/DL (ref 70–99)
ISTAT ACTIVATED CLOTTING TIME: 125 SECONDS (ref 74–137)
ISTAT BLOOD GAS BASE EXCESS: 1 MMOL/L (ref ?–30)
ISTAT BLOOD GAS HCO3: 25.4 MEQ/L (ref 22–26)
ISTAT BLOOD GAS O2 SATURATION: 99 % (ref 92–100)
ISTAT BLOOD GAS PCO2: 40.6 MMHG (ref 35–45)
ISTAT BLOOD GAS PH: 7.41 (ref 7.35–7.45)
ISTAT BLOOD GAS PO2: 133 MMHG (ref 80–105)
ISTAT BLOOD GAS TCO2: 27 MMOL/L (ref 22–32)
ISTAT HEMATOCRIT: 34 %
ISTAT IONIZED CALCIUM: 1.17 MMOL/L (ref 1.12–1.32)
ISTAT POTASSIUM: 3.5 MMOL/L (ref 3.6–5.1)
ISTAT SODIUM: 139 MMOL/L (ref 136–145)

## 2021-01-06 PROCEDURE — 88305 TISSUE EXAM BY PATHOLOGIST: CPT | Performed by: THORACIC SURGERY (CARDIOTHORACIC VASCULAR SURGERY)

## 2021-01-06 PROCEDURE — 88342 IMHCHEM/IMCYTCHM 1ST ANTB: CPT | Performed by: THORACIC SURGERY (CARDIOTHORACIC VASCULAR SURGERY)

## 2021-01-06 PROCEDURE — 0BTJ4ZZ RESECTION OF LEFT LOWER LUNG LOBE, PERCUTANEOUS ENDOSCOPIC APPROACH: ICD-10-PCS | Performed by: THORACIC SURGERY (CARDIOTHORACIC VASCULAR SURGERY)

## 2021-01-06 PROCEDURE — 88360 TUMOR IMMUNOHISTOCHEM/MANUAL: CPT | Performed by: INTERNAL MEDICINE

## 2021-01-06 PROCEDURE — 88341 IMHCHEM/IMCYTCHM EA ADD ANTB: CPT | Performed by: INTERNAL MEDICINE

## 2021-01-06 PROCEDURE — 88381 MICRODISSECTION MANUAL: CPT | Performed by: INTERNAL MEDICINE

## 2021-01-06 PROCEDURE — 82803 BLOOD GASES ANY COMBINATION: CPT

## 2021-01-06 PROCEDURE — 81210 BRAF GENE: CPT | Performed by: INTERNAL MEDICINE

## 2021-01-06 PROCEDURE — 88309 TISSUE EXAM BY PATHOLOGIST: CPT | Performed by: THORACIC SURGERY (CARDIOTHORACIC VASCULAR SURGERY)

## 2021-01-06 PROCEDURE — P9045 ALBUMIN (HUMAN), 5%, 250 ML: HCPCS | Performed by: ANESTHESIOLOGY

## 2021-01-06 PROCEDURE — 3E0T3BZ INTRODUCTION OF ANESTHETIC AGENT INTO PERIPHERAL NERVES AND PLEXI, PERCUTANEOUS APPROACH: ICD-10-PCS | Performed by: THORACIC SURGERY (CARDIOTHORACIC VASCULAR SURGERY)

## 2021-01-06 PROCEDURE — 88342 IMHCHEM/IMCYTCHM 1ST ANTB: CPT | Performed by: INTERNAL MEDICINE

## 2021-01-06 PROCEDURE — 07B74ZZ EXCISION OF THORAX LYMPHATIC, PERCUTANEOUS ENDOSCOPIC APPROACH: ICD-10-PCS | Performed by: THORACIC SURGERY (CARDIOTHORACIC VASCULAR SURGERY)

## 2021-01-06 PROCEDURE — 84132 ASSAY OF SERUM POTASSIUM: CPT

## 2021-01-06 PROCEDURE — 82330 ASSAY OF CALCIUM: CPT

## 2021-01-06 PROCEDURE — 88341 IMHCHEM/IMCYTCHM EA ADD ANTB: CPT | Performed by: THORACIC SURGERY (CARDIOTHORACIC VASCULAR SURGERY)

## 2021-01-06 PROCEDURE — 76942 ECHO GUIDE FOR BIOPSY: CPT | Performed by: ANESTHESIOLOGY

## 2021-01-06 PROCEDURE — 85014 HEMATOCRIT: CPT

## 2021-01-06 PROCEDURE — 0BJ08ZZ INSPECTION OF TRACHEOBRONCHIAL TREE, VIA NATURAL OR ARTIFICIAL OPENING ENDOSCOPIC: ICD-10-PCS | Performed by: THORACIC SURGERY (CARDIOTHORACIC VASCULAR SURGERY)

## 2021-01-06 PROCEDURE — 86920 COMPATIBILITY TEST SPIN: CPT

## 2021-01-06 PROCEDURE — 84295 ASSAY OF SERUM SODIUM: CPT

## 2021-01-06 PROCEDURE — 81235 EGFR GENE COM VARIANTS: CPT | Performed by: INTERNAL MEDICINE

## 2021-01-06 PROCEDURE — 85347 COAGULATION TIME ACTIVATED: CPT

## 2021-01-06 RX ORDER — ALBUMIN, HUMAN INJ 5% 5 %
SOLUTION INTRAVENOUS CONTINUOUS PRN
Status: DISCONTINUED | OUTPATIENT
Start: 2021-01-06 | End: 2021-01-06 | Stop reason: SURG

## 2021-01-06 RX ORDER — SODIUM CHLORIDE 9 MG/ML
INJECTION, SOLUTION INTRAVENOUS CONTINUOUS
Status: DISCONTINUED | OUTPATIENT
Start: 2021-01-06 | End: 2021-01-07

## 2021-01-06 RX ORDER — OXYCODONE HYDROCHLORIDE 5 MG/1
5 TABLET ORAL EVERY 4 HOURS PRN
Status: DISCONTINUED | OUTPATIENT
Start: 2021-01-06 | End: 2021-01-08

## 2021-01-06 RX ORDER — MIDAZOLAM HYDROCHLORIDE 1 MG/ML
INJECTION INTRAMUSCULAR; INTRAVENOUS AS NEEDED
Status: DISCONTINUED | OUTPATIENT
Start: 2021-01-06 | End: 2021-01-06 | Stop reason: SURG

## 2021-01-06 RX ORDER — SODIUM CHLORIDE 9 MG/ML
INJECTION, SOLUTION INTRAVENOUS CONTINUOUS PRN
Status: DISCONTINUED | OUTPATIENT
Start: 2021-01-06 | End: 2021-01-06 | Stop reason: SURG

## 2021-01-06 RX ORDER — HYDROCODONE BITARTRATE AND ACETAMINOPHEN 5; 325 MG/1; MG/1
2 TABLET ORAL AS NEEDED
Status: COMPLETED | OUTPATIENT
Start: 2021-01-06 | End: 2021-01-07

## 2021-01-06 RX ORDER — BUPIVACAINE HYDROCHLORIDE 2.5 MG/ML
INJECTION, SOLUTION EPIDURAL; INFILTRATION; INTRACAUDAL AS NEEDED
Status: DISCONTINUED | OUTPATIENT
Start: 2021-01-06 | End: 2021-01-06 | Stop reason: HOSPADM

## 2021-01-06 RX ORDER — CEFAZOLIN SODIUM/WATER 2 G/20 ML
SYRINGE (ML) INTRAVENOUS AS NEEDED
Status: DISCONTINUED | OUTPATIENT
Start: 2021-01-06 | End: 2021-01-06 | Stop reason: SURG

## 2021-01-06 RX ORDER — HYDROCODONE BITARTRATE AND ACETAMINOPHEN 5; 325 MG/1; MG/1
1 TABLET ORAL AS NEEDED
Status: COMPLETED | OUTPATIENT
Start: 2021-01-06 | End: 2021-01-07

## 2021-01-06 RX ORDER — DIPHENHYDRAMINE HYDROCHLORIDE 50 MG/ML
12.5 INJECTION INTRAMUSCULAR; INTRAVENOUS AS NEEDED
Status: ACTIVE | OUTPATIENT
Start: 2021-01-06 | End: 2021-01-06

## 2021-01-06 RX ORDER — ONDANSETRON 2 MG/ML
INJECTION INTRAMUSCULAR; INTRAVENOUS AS NEEDED
Status: DISCONTINUED | OUTPATIENT
Start: 2021-01-06 | End: 2021-01-06 | Stop reason: SURG

## 2021-01-06 RX ORDER — NALOXONE HYDROCHLORIDE 0.4 MG/ML
80 INJECTION, SOLUTION INTRAMUSCULAR; INTRAVENOUS; SUBCUTANEOUS AS NEEDED
Status: ACTIVE | OUTPATIENT
Start: 2021-01-06 | End: 2021-01-06

## 2021-01-06 RX ORDER — ATORVASTATIN CALCIUM 20 MG/1
20 TABLET, FILM COATED ORAL DAILY
Status: DISCONTINUED | OUTPATIENT
Start: 2021-01-07 | End: 2021-01-08

## 2021-01-06 RX ORDER — METOCLOPRAMIDE HYDROCHLORIDE 5 MG/ML
10 INJECTION INTRAMUSCULAR; INTRAVENOUS AS NEEDED
Status: ACTIVE | OUTPATIENT
Start: 2021-01-06 | End: 2021-01-06

## 2021-01-06 RX ORDER — ENOXAPARIN SODIUM 100 MG/ML
40 INJECTION SUBCUTANEOUS DAILY
Status: DISCONTINUED | OUTPATIENT
Start: 2021-01-06 | End: 2021-01-08

## 2021-01-06 RX ORDER — KETOROLAC TROMETHAMINE 30 MG/ML
15 INJECTION, SOLUTION INTRAMUSCULAR; INTRAVENOUS EVERY 6 HOURS
Status: DISCONTINUED | OUTPATIENT
Start: 2021-01-06 | End: 2021-01-08

## 2021-01-06 RX ORDER — ROCURONIUM BROMIDE 10 MG/ML
INJECTION, SOLUTION INTRAVENOUS AS NEEDED
Status: DISCONTINUED | OUTPATIENT
Start: 2021-01-06 | End: 2021-01-06 | Stop reason: SURG

## 2021-01-06 RX ORDER — ACETAMINOPHEN 10 MG/ML
INJECTION, SOLUTION INTRAVENOUS AS NEEDED
Status: DISCONTINUED | OUTPATIENT
Start: 2021-01-06 | End: 2021-01-06 | Stop reason: SURG

## 2021-01-06 RX ORDER — BISACODYL 10 MG
10 SUPPOSITORY, RECTAL RECTAL
Status: DISCONTINUED | OUTPATIENT
Start: 2021-01-06 | End: 2021-01-08

## 2021-01-06 RX ORDER — ONDANSETRON 2 MG/ML
4 INJECTION INTRAMUSCULAR; INTRAVENOUS ONCE AS NEEDED
Status: ACTIVE | OUTPATIENT
Start: 2021-01-06 | End: 2021-01-06

## 2021-01-06 RX ORDER — MEPERIDINE HYDROCHLORIDE 25 MG/ML
12.5 INJECTION INTRAMUSCULAR; INTRAVENOUS; SUBCUTANEOUS AS NEEDED
Status: DISCONTINUED | OUTPATIENT
Start: 2021-01-06 | End: 2021-01-08

## 2021-01-06 RX ORDER — SODIUM CHLORIDE, SODIUM LACTATE, POTASSIUM CHLORIDE, CALCIUM CHLORIDE 600; 310; 30; 20 MG/100ML; MG/100ML; MG/100ML; MG/100ML
INJECTION, SOLUTION INTRAVENOUS CONTINUOUS
Status: DISCONTINUED | OUTPATIENT
Start: 2021-01-06 | End: 2021-01-06

## 2021-01-06 RX ORDER — ACETAMINOPHEN 10 MG/ML
1000 INJECTION, SOLUTION INTRAVENOUS EVERY 6 HOURS
Status: DISCONTINUED | OUTPATIENT
Start: 2021-01-06 | End: 2021-01-07

## 2021-01-06 RX ORDER — ONDANSETRON 2 MG/ML
4 INJECTION INTRAMUSCULAR; INTRAVENOUS EVERY 6 HOURS PRN
Status: DISCONTINUED | OUTPATIENT
Start: 2021-01-06 | End: 2021-01-08

## 2021-01-06 RX ORDER — ASPIRIN 81 MG/1
81 TABLET ORAL 2 TIMES DAILY
Status: DISCONTINUED | OUTPATIENT
Start: 2021-01-07 | End: 2021-01-08

## 2021-01-06 RX ORDER — POLYETHYLENE GLYCOL 3350 17 G/17G
17 POWDER, FOR SOLUTION ORAL DAILY
Status: DISCONTINUED | OUTPATIENT
Start: 2021-01-06 | End: 2021-01-08

## 2021-01-06 RX ORDER — ONDANSETRON 2 MG/ML
4 INJECTION INTRAMUSCULAR; INTRAVENOUS AS NEEDED
Status: ACTIVE | OUTPATIENT
Start: 2021-01-06 | End: 2021-01-06

## 2021-01-06 RX ORDER — CEFAZOLIN SODIUM/WATER 2 G/20 ML
2 SYRINGE (ML) INTRAVENOUS EVERY 8 HOURS
Status: COMPLETED | OUTPATIENT
Start: 2021-01-06 | End: 2021-01-07

## 2021-01-06 RX ORDER — DOCUSATE SODIUM 100 MG/1
100 CAPSULE, LIQUID FILLED ORAL 2 TIMES DAILY PRN
Status: DISCONTINUED | OUTPATIENT
Start: 2021-01-06 | End: 2021-01-08

## 2021-01-06 RX ORDER — METOCLOPRAMIDE HYDROCHLORIDE 5 MG/ML
10 INJECTION INTRAMUSCULAR; INTRAVENOUS ONCE AS NEEDED
Status: ACTIVE | OUTPATIENT
Start: 2021-01-06 | End: 2021-01-06

## 2021-01-06 RX ORDER — IPRATROPIUM BROMIDE AND ALBUTEROL SULFATE 2.5; .5 MG/3ML; MG/3ML
3 SOLUTION RESPIRATORY (INHALATION) EVERY 4 HOURS PRN
Status: DISCONTINUED | OUTPATIENT
Start: 2021-01-06 | End: 2021-01-08

## 2021-01-06 RX ORDER — MORPHINE SULFATE 2 MG/ML
2 INJECTION, SOLUTION INTRAMUSCULAR; INTRAVENOUS EVERY 4 HOURS PRN
Status: DISCONTINUED | OUTPATIENT
Start: 2021-01-06 | End: 2021-01-08

## 2021-01-06 RX ORDER — SODIUM PHOSPHATE, DIBASIC AND SODIUM PHOSPHATE, MONOBASIC 7; 19 G/133ML; G/133ML
1 ENEMA RECTAL ONCE AS NEEDED
Status: DISCONTINUED | OUTPATIENT
Start: 2021-01-06 | End: 2021-01-08

## 2021-01-06 RX ORDER — HYDROMORPHONE HYDROCHLORIDE 1 MG/ML
0.4 INJECTION, SOLUTION INTRAMUSCULAR; INTRAVENOUS; SUBCUTANEOUS EVERY 5 MIN PRN
Status: ACTIVE | OUTPATIENT
Start: 2021-01-06 | End: 2021-01-06

## 2021-01-06 RX ORDER — KETOROLAC TROMETHAMINE 30 MG/ML
INJECTION, SOLUTION INTRAMUSCULAR; INTRAVENOUS AS NEEDED
Status: DISCONTINUED | OUTPATIENT
Start: 2021-01-06 | End: 2021-01-06 | Stop reason: SURG

## 2021-01-06 RX ORDER — TRIAMTERENE AND HYDROCHLOROTHIAZIDE 37.5; 25 MG/1; MG/1
1 CAPSULE ORAL DAILY
Status: DISCONTINUED | OUTPATIENT
Start: 2021-01-07 | End: 2021-01-08

## 2021-01-06 RX ORDER — DEXAMETHASONE SODIUM PHOSPHATE 4 MG/ML
VIAL (ML) INJECTION AS NEEDED
Status: DISCONTINUED | OUTPATIENT
Start: 2021-01-06 | End: 2021-01-06 | Stop reason: SURG

## 2021-01-06 RX ORDER — CALCIUM CARBONATE 200(500)MG
1000 TABLET,CHEWABLE ORAL 3 TIMES DAILY PRN
Status: DISCONTINUED | OUTPATIENT
Start: 2021-01-06 | End: 2021-01-08

## 2021-01-06 RX ORDER — PHENYLEPHRINE HCL 10 MG/ML
VIAL (ML) INJECTION AS NEEDED
Status: DISCONTINUED | OUTPATIENT
Start: 2021-01-06 | End: 2021-01-06 | Stop reason: SURG

## 2021-01-06 RX ORDER — OXYCODONE HYDROCHLORIDE 5 MG/1
10 TABLET ORAL EVERY 4 HOURS PRN
Status: DISCONTINUED | OUTPATIENT
Start: 2021-01-06 | End: 2021-01-08

## 2021-01-06 RX ORDER — MORPHINE SULFATE 4 MG/ML
6 INJECTION, SOLUTION INTRAMUSCULAR; INTRAVENOUS EVERY 4 HOURS PRN
Status: DISCONTINUED | OUTPATIENT
Start: 2021-01-06 | End: 2021-01-08

## 2021-01-06 RX ORDER — SODIUM CHLORIDE 0.9 % (FLUSH) 0.9 %
10 SYRINGE (ML) INJECTION AS NEEDED
Status: DISCONTINUED | OUTPATIENT
Start: 2021-01-06 | End: 2021-01-08

## 2021-01-06 RX ORDER — MORPHINE SULFATE 4 MG/ML
4 INJECTION, SOLUTION INTRAMUSCULAR; INTRAVENOUS EVERY 4 HOURS PRN
Status: DISCONTINUED | OUTPATIENT
Start: 2021-01-06 | End: 2021-01-08

## 2021-01-06 RX ORDER — MIDAZOLAM HYDROCHLORIDE 1 MG/ML
1 INJECTION INTRAMUSCULAR; INTRAVENOUS EVERY 5 MIN PRN
Status: ACTIVE | OUTPATIENT
Start: 2021-01-06 | End: 2021-01-06

## 2021-01-06 RX ADMIN — KETOROLAC TROMETHAMINE 30 MG: 30 INJECTION, SOLUTION INTRAMUSCULAR; INTRAVENOUS at 10:15:00

## 2021-01-06 RX ADMIN — SODIUM CHLORIDE: 9 INJECTION, SOLUTION INTRAVENOUS at 07:48:00

## 2021-01-06 RX ADMIN — MIDAZOLAM HYDROCHLORIDE 2 MG: 1 INJECTION INTRAMUSCULAR; INTRAVENOUS at 07:50:00

## 2021-01-06 RX ADMIN — ONDANSETRON 4 MG: 2 INJECTION INTRAMUSCULAR; INTRAVENOUS at 08:46:00

## 2021-01-06 RX ADMIN — PHENYLEPHRINE HCL 100 MCG: 10 MG/ML VIAL (ML) INJECTION at 10:00:00

## 2021-01-06 RX ADMIN — ALBUMIN, HUMAN INJ 5%: 5 SOLUTION INTRAVENOUS at 09:21:00

## 2021-01-06 RX ADMIN — PHENYLEPHRINE HCL 200 MCG: 10 MG/ML VIAL (ML) INJECTION at 09:04:00

## 2021-01-06 RX ADMIN — SODIUM CHLORIDE: 9 INJECTION, SOLUTION INTRAVENOUS at 10:39:00

## 2021-01-06 RX ADMIN — PHENYLEPHRINE HCL 100 MCG: 10 MG/ML VIAL (ML) INJECTION at 10:14:00

## 2021-01-06 RX ADMIN — ALBUMIN, HUMAN INJ 5%: 5 SOLUTION INTRAVENOUS at 10:00:00

## 2021-01-06 RX ADMIN — PHENYLEPHRINE HCL 100 MCG: 10 MG/ML VIAL (ML) INJECTION at 08:59:00

## 2021-01-06 RX ADMIN — DEXAMETHASONE SODIUM PHOSPHATE 8 MG: 4 MG/ML VIAL (ML) INJECTION at 08:46:00

## 2021-01-06 RX ADMIN — ACETAMINOPHEN 1000 MG: 10 INJECTION, SOLUTION INTRAVENOUS at 10:15:00

## 2021-01-06 RX ADMIN — PHENYLEPHRINE HCL 100 MCG: 10 MG/ML VIAL (ML) INJECTION at 09:12:00

## 2021-01-06 RX ADMIN — PHENYLEPHRINE HCL 100 MCG: 10 MG/ML VIAL (ML) INJECTION at 09:21:00

## 2021-01-06 RX ADMIN — PHENYLEPHRINE HCL 100 MCG: 10 MG/ML VIAL (ML) INJECTION at 09:33:00

## 2021-01-06 RX ADMIN — ROCURONIUM BROMIDE 70 MG: 10 INJECTION, SOLUTION INTRAVENOUS at 07:57:00

## 2021-01-06 RX ADMIN — CEFAZOLIN SODIUM/WATER 2 G: 2 G/20 ML SYRINGE (ML) INTRAVENOUS at 08:15:00

## 2021-01-06 RX ADMIN — ALBUMIN, HUMAN INJ 5%: 5 SOLUTION INTRAVENOUS at 09:33:00

## 2021-01-06 NOTE — ANESTHESIA PROCEDURE NOTES
Peripheral IV  Date/Time: 1/6/2021 8:15 AM  Inserted by: Chelsea Sandoval MD    Placement  Needle size: 18 G  Laterality: left  Location: hand  Local anesthetic: none  Site prep: chlorhexidine and alcohol  Technique: anatomical landmarks  Attempts: 1

## 2021-01-06 NOTE — ANESTHESIA PREPROCEDURE EVALUATION
PRE-OP EVALUATION    Patient Name: Zahida Salmeron    Pre-op Diagnosis: lung cancer    Procedure(s):  Flexible bronchoscopy, video assisted thoracoscopic left lower lobectomy, mediastinal lymphadenectomy    Surgeon(s) and Role:     * Trisha Gordon, Ruiz Love, polyps-adenomatous, diverticulosis   • COLONOSCOPY  09/08/2018    diverticulosis, TI wnl, random TI bx negative , random colon bx showed changes of collagenous colitis   • COLONOSCOPY, POSSIBLE BIOPSY, POSSIBLE POLYPECTOMY 18028 N/A 2/28/2013    Performed 01/04/2021    BUN 16.0 11/11/2020    CREATSERUM 0.99 01/04/2021    CREATSERUM 0.84 11/11/2020    GLU 91 01/04/2021     11/11/2020    CA 9.2 01/04/2021    CA 9.1 11/11/2020            Airway      Mallampati: II  Mouth opening: 3 FB  TM distance: 4 -

## 2021-01-06 NOTE — ANESTHESIA PROCEDURE NOTES
Airway  Urgency: elective      General Information and Staff    Patient location during procedure: OR  Anesthesiologist: Albert Stovall MD  Performed: anesthesiologist     Indications and Patient Condition  Indications for airway management: anesthes

## 2021-01-06 NOTE — ANESTHESIA POSTPROCEDURE EVALUATION
[address] [facility] Patient Status:  Inpatient   Age/Gender 77year old female MRN [id number]   [facility] 6NE-A Attending Nilton Green, Edilia Hicks MD   [facility] Day # 0 PCP Milinda Gowers, MD       Anesthesia Post-op Note    Procedu

## 2021-01-06 NOTE — INTERVAL H&P NOTE
Pre-op Diagnosis: lung cancer    The above referenced H&P was reviewed by Marck Davis PA-C on 1/6/2021, the patient was examined and no significant changes have occurred in the patient's condition since the H&P was performed.   I discussed with the patient

## 2021-01-06 NOTE — ANESTHESIA PROCEDURE NOTES
Arterial Line  Performed by: Flash Nguyen MD  Authorized by: Flash Nguyen MD     General Information and Staff    Procedure Start:   Procedure End: 1/6/2021 8:10 AM  Anesthesiologist:  Flash Nguyen MD  Performed By:  Bryce Lopez

## 2021-01-06 NOTE — PROGRESS NOTES
Received pt from [facility] s/p Left VATS with multiple biopsies. She is drowsy but answering questions appropriately and moving all extremities to command. Left lateral chest has 2 incisions that are C/D/I closed with dermabond open to air.  No subcutaneous emph

## 2021-01-06 NOTE — BRIEF OP NOTE
Pre-Operative Diagnosis: lung cancer     Post-Operative Diagnosis: same      Procedure Performed:   Procedure(s):  Flexible bronchoscopy, video assisted thoracoscopic left lower lobectomy, mediastinal lymphadenectomy    Surgeon(s) and Role:     Leander Woodward

## 2021-01-06 NOTE — CONSULTS
[address] [facility] Patient Status:  Inpatient    1945 MRN [id number]   [facility] 6NE-A Attending Emily Maldonado., Perlita Caldwell MD   Hosp Day # 0 PCP Cathy Dupree MD     Date of Admission: 2021  5:43 AM  Admission Diag Alan Eagle MD at [facility] Right 5/19/2016    Performed by Suma Mora MD at [facility]   • HERNIA SURGERY  7/5/11   • HERNIA SURGERY  8/8/12    right ing hernia    • HERNIA SURGERY  6/2013   • HYS (DILAUDID) 1 MG/ML injection 0.4 mg, 0.4 mg, Intravenous, Q5 Min PRN  •  HYDROcodone-acetaminophen (NORCO) 5-325 MG per tab 1 tablet, 1 tablet, Oral, PRN **OR** HYDROcodone-acetaminophen (NORCO) 5-325 MG per tab 2 tablet, 2 tablet, Oral, PRN  •  ondansetro (MIRALAX) powder packet 17 g, 17 g, Oral, Daily  •  magnesium hydroxide (MILK OF MAGNESIA) 400 MG/5ML suspension 30 mL, 30 mL, Oral, Daily PRN  •  bisacodyl (DULCOLAX) rectal suppository 10 mg, 10 mg, Rectal, Daily PRN  •  Fleet Enema (FLEET) 7-19 GM/118ML hrs:   BP Temp Temp src Pulse Resp SpO2 Weight   01/06/21 1400 (!) 145/131 — — 78 15 99 % —   01/06/21 1330 — — — 64 15 100 % —   01/06/21 1300 142/80 — — 75 18 97 % —   01/06/21 1230 — — — 64 14 100 % —   01/06/21 1215 — — — 68 17 99 % —   01/06/21 1200 1 visualized all relevant chest imaging in PACS. I agree with the radiology interpretation except where noted. ASSESSMENT/PLAN:  1. Lung cancer:  -s/p VATS left lower lobectomy  -Chest tube per CT surgery  -pain control   -follow up on final path   2.  H

## 2021-01-06 NOTE — OPERATIVE REPORT
[address]    PATIENT'S NAME: Paulina Wood LINH   ATTENDING PHYSICIAN: Zaid Ayoub MD   OPERATING PHYSICIAN: Kasia Landers.  Victorina Ayoub MD   PATIENT ACCOUNT#:   [id number]    [facility]  MEDICAL RECORD #:   [id number]       DATE OF confirm patient, side, and site of surgery. I made a 2 cm incision in the inframammary crease midclavicular line and entered the chest bluntly. Camera through this incision revealed that I was safely in the chest space.   I then made 2 additional incision the previously placed lung clamp and placed my [employer] powered vascular stapler around the vein and transected it. I then turned my attention to the bronchus. Additional level 11 lymph nodes found in the bronchus were removed and sent for pathology.   I t well.  I was present for the entirety of the procedure. Dictated By Xiomara Baumann MD  d: 01/06/2021 10:32:54  t: 01/06/2021 10:47:54  [facility] [id number]  JLL/

## 2021-01-06 NOTE — H&P
[facility] Hospitalist History and Physical      Chief complaint\"  Sp vats     PCP: Quirino Albright MD      History of Present Illness: Patient is a 77year old female with PMH sig for  HTN, colagenous colitis, endometrial ca, lung adeno who is now sp VATS/LL MAIN OR   • OTHER SURGICAL HISTORY  1962    pilonidal cyst   • OTHER SURGICAL HISTORY  6-28-12    flow/us/cysto-Dr. Bryce Alvarez        ALL:    Ciprofloxacin               Comment:rash     No current outpatient medications on file.       Social History    Tobacco appreciated     Data Review:        Assessment/Plan:     # lung adenocarcinoma   - sp VATs  - CT per CV  - pain control w IV narcotics, transition to po as able    # HL  - statin    HTN  - cardizem/dyazide    Lovenox          Outpatient records or previous

## 2021-01-07 PROCEDURE — 97161 PT EVAL LOW COMPLEX 20 MIN: CPT

## 2021-01-07 PROCEDURE — 97116 GAIT TRAINING THERAPY: CPT

## 2021-01-07 PROCEDURE — 97165 OT EVAL LOW COMPLEX 30 MIN: CPT

## 2021-01-07 PROCEDURE — 97535 SELF CARE MNGMENT TRAINING: CPT

## 2021-01-07 RX ORDER — ACETAMINOPHEN 500 MG
1000 TABLET ORAL EVERY 6 HOURS
Status: DISCONTINUED | OUTPATIENT
Start: 2021-01-08 | End: 2021-01-08

## 2021-01-07 NOTE — PLAN OF CARE
Assumed care of patient at 1. Patient is AOX4, following commands, and LOVE. Patient denies dizziness, SOB, nausea, or pain. Patient states, she has soreness in her left arm which is relieved with warm packs.  Chest tube to water seal. Jovel catheter in p

## 2021-01-07 NOTE — PROGRESS NOTES
Thoracic Surgery Progress Note     Leopoldo Crape is a 77year old female. MRN [id number]. Admitted 1/6/2021    [address] [facility] EVENTS:     No acute events overnight. Pain well controlled, nerve block worked well.    Ambulated this morning with nurse withou of day out of bed, in chair. Encouraged cough and IS use 10x hourly.      Chau Chavez PA-C  Thoracic Surgery  Pager: [phone]

## 2021-01-07 NOTE — PROGRESS NOTES
897 [facility] Patient Status:  Inpatient    1945 MRN [id number]   [facility] 6NE-A Attending Marge Colmenares., Bismark Nuñez MD   Hosp Day # 1 PCP Bryce Thomas MD     Pulm / Critical Care Progress Note     S: pt states BS.   Extremity: no edema     Recent Labs   Lab 01/04/21  0855   WBC 8.4   HGB 12.9   HCT 39.8   .0     No results for input(s): INR in the last 168 hours.       Recent Labs   Lab 01/04/21  0855      K 4.2      CO2 32.0   BUN 19*     Crea

## 2021-01-07 NOTE — PLAN OF CARE
0800 Pt up in chair, A/Ox4, in good spirits. States mild \"soreness\" at surgical site. Encouraged to take norco before pain worsens. L lateral chest surgical incisions appoximated, mild bruising. L chest tube site WDL, no crepitus.  Chest tube with intermi

## 2021-01-07 NOTE — PHYSICAL THERAPY NOTE
PHYSICAL THERAPY EVALUATION - INPATIENT     Room Number: 6177/4828-N  Evaluation Date: 1/7/2021  Type of Evaluation: Initial  Physician Order: PT Eval and Treat    Presenting Problem: s/p L lower lobectomy   Reason for Therapy: Mobility Dysfunction and SURGERY  7/5/11   • HERNIA SURGERY  8/8/12    right ing hernia    • HERNIA SURGERY  6/2013   • HYSTERECTOMY  11/29/2010    JAMES BSO for endometrial cancer   • KNEE REPLACEMENT SURGERY Right 2020   • KNEE TOTAL REPLACEMENT Right 9/8/2020    Performed by Lorraine Allen up from a chair with arms (e.g., wheelchair, bedside commode, etc.): A Little   -   Moving from lying on back to sitting on the side of the bed?: A Little   How much help from another person does the patient currently need. ..   -   Moving to and from a bed evaluation, the patient presents with the following impairments decreased cardiopulmonary endurance, need to assess pt ability to negotiate stairs. Functional outcome measures completed include AM-PAC.   Based on this evaluation, patient's clinical present

## 2021-01-07 NOTE — PROGRESS NOTES
[facility] Hospitalist Progress Note     PCP: Blayne Morales MD    CC:  Follow up    SUBJECTIVE:  No events overnight. Jovel removed. Has not urinated yet. +flatus. Pain manageable.  Hopeful for dc tomorrow    OBJECTIVE:  Temp:  [97.2 °F (36.2 °C)-98.8 °F (37.1 oxyCODONE HCl, oxyCODONE HCl, ipratropium-albuterol       Assessment/Plan:     Active Problems:    Preop testing    # lung adenocarcinoma   - sp VATs  - CT per CV  -IV morphine prn, oxy prn.  Antiemetics prn, bowel regimen     # HL  - statin     HTN  - card

## 2021-01-07 NOTE — CM/SW NOTE
01/07/21 1200   CM/SW Screening   Referral Source    Information Source Chart review;Nursing rounds   Patient's Mental Status Alert;Oriented   Patient lives with Spouse   Patient Status Prior to Admission   Independent with ADLs and Mobility

## 2021-01-07 NOTE — OCCUPATIONAL THERAPY NOTE
OCCUPATIONAL THERAPY QUICK EVALUATION - INPATIENT    Room Number: 4119/3595-F  Evaluation Date: 1/7/2021     Type of Evaluation: Quick Eval  Presenting Problem: 1/6 R lobectomy, see op note    Physician Order: IP Consult to Occupational Therapy  Reason for Luis F Muse MD at [address] [place] [address]   • EGD  09/08/2018    normal, gastric and SB Bx negative   • ENDOSCOPIC NASAL POLYPECTOMY Right 5/19/2016    Performed by Rick Wells MD at [address] [place] [address]   • EXCISION OF NASAL BONY MASS Right Activity Short Form   How much help from another person does the patient currently need…  -   Putting on and taking off regular lower body clothing?: None   -   Bathing (including washing, rinsing, drying)?: None  -   Toileting, which includes using toilet Deficits  LOW - No comorbidities nor modifications of tasks    Clinical Decision Making  LOW - Analysis of occupational profile, problem-focused assessments, limited treatment options    Overall Complexity  LOW     OT Discharge Recommendations: Home

## 2021-01-08 VITALS
RESPIRATION RATE: 20 BRPM | HEART RATE: 67 BPM | TEMPERATURE: 98 F | BODY MASS INDEX: 36.24 KG/M2 | SYSTOLIC BLOOD PRESSURE: 124 MMHG | HEIGHT: 66.5 IN | DIASTOLIC BLOOD PRESSURE: 63 MMHG | WEIGHT: 228.19 LBS | OXYGEN SATURATION: 91 %

## 2021-01-08 LAB — HGB BLD-MCNC: 10.2 G/DL

## 2021-01-08 PROCEDURE — 85018 HEMOGLOBIN: CPT | Performed by: HOSPITALIST

## 2021-01-08 RX ORDER — POLYETHYLENE GLYCOL 3350 17 G/17G
17 POWDER, FOR SOLUTION ORAL DAILY
Qty: 7 EACH | Refills: 0 | Status: SHIPPED | OUTPATIENT
Start: 2021-01-09 | End: 2021-03-22

## 2021-01-08 RX ORDER — ACETAMINOPHEN 500 MG
1000 TABLET ORAL EVERY 6 HOURS PRN
Qty: 56 TABLET | Refills: 0 | Status: SHIPPED | OUTPATIENT
Start: 2021-01-08

## 2021-01-08 RX ORDER — OXYCODONE HYDROCHLORIDE 5 MG/1
5 TABLET ORAL EVERY 4 HOURS PRN
Qty: 30 TABLET | Refills: 0 | Status: SHIPPED | OUTPATIENT
Start: 2021-01-08 | End: 2021-03-22

## 2021-01-08 NOTE — PROGRESS NOTES
897 [facility] Patient Status:  Inpatient    1945 MRN [id number]   [facility] 8NE-A Attending Ruth Barker, Donavan Puentes MD   Hosp Day # 2 PCP Demetrice Grissom MD     Pulm / Critical Care Progress Note     S: some nause BS.   Extremity: no edema     Recent Labs   Lab 01/04/21  0855 01/08/21  0514   WBC 8.4  --    HGB 12.9 10.2*   HCT 39.8  --    .0  --      No results for input(s): INR in the last 168 hours.       Recent Labs   Lab 01/04/21  0855      K 4.2

## 2021-01-08 NOTE — PROGRESS NOTES
[facility] Hospitalist Progress Note     PCP: Demetrice Grissom MD    CC:  Follow up    SUBJECTIVE:  Pt just walked from restroom with PCT-- just took pain medication- says feels funny/queasy.  Otherwise pain manageable. +U, +bm    OBJECTIVE:  Temp:  [97.2 °F Fleet Enema, Calcium Carbonate Antacid, oxyCODONE HCl, oxyCODONE HCl, ipratropium-albuterol       Assessment/Plan:     Active Problems:    Preop testing    # lung adenocarcinoma   - sp VATs  - CT per CV  -IV morphine prn, oxy prn.  Antiemetics prn, bowel re

## 2021-01-08 NOTE — PLAN OF CARE
Dc planning today after chest tube is discontinued.   Problem: RESPIRATORY - ADULT  Goal: Achieves optimal ventilation and oxygenation  Description: INTERVENTIONS:  - Assess for changes in respiratory status  - Assess for changes in mentation and behavior

## 2021-01-08 NOTE — PHYSICAL THERAPY NOTE
Attempted to see pt for PT this afternoon. Per RN, pt is being discharged and already in wheel chair to be transported.

## 2021-01-08 NOTE — PROGRESS NOTES
THORACIC SURGERY POST-OPERATIVE PROGRESS NOTE    Subjective: had loose stools last night. Has resolved now.   No other complaints    Objective:     01/08/21  0837   BP: 116/51   Pulse: 70   Resp: 20   Temp: 97.9 °F (36.6 °C)       I/O last 3 completed shift

## 2021-01-08 NOTE — PROGRESS NOTES
Assumed care of patient at ~1545. Alert and oriented. Vitals signs stable. Sinus rhythm on telemetry. Pain controlled with scheduled IV medication. Left chest tube to waterseal. Serosanquinous output. Intermittent air leak.   Dressing clean dry and int

## 2021-01-08 NOTE — PLAN OF CARE
Pt is A&Ox4. RA, saturating 92%. L CT to water seal, no crepitus noted. IS = 1000. NSR on tele, denies cardiovascular symptoms. Continent B&B. C/O mild pain left chest incisions, hot pack and prn meds given with relief.  Up x1 SBA.     0100: placed on 2L NC

## 2021-01-10 LAB — BLOOD TYPE BARCODE: 6200

## 2021-01-11 NOTE — DISCHARGE SUMMARY
[facility]    Discharge Summary    Audie Win Patient Status:  Inpatient    1945 MRN [id number]   [facility] 8NE-A Attending No att. providers found   Hosp Day # 2 PCP Wilfredo Olivera MD     Date of Admission: 2021 Dis at acceptable levels the chest tubes were pulled and the patient was sent home in good condition.     Consultations: Pulmonology, Internal Medicine    Procedures: flexible bronchoscopy, VATS left lower lobectomy, mediastinal lymphadenectomy    Complications Pack         Follow up Visits: Follow-up with me in 1-2 weeks.  My office will call with appointment    Other Discharge Instructions: see separate d/c instructions    Ivelisse Meneses  1/11/2021  7:45 AM

## 2021-01-18 LAB — ADEQUACY OF SPECIMEN: ADEQUATE

## 2021-01-20 ENCOUNTER — OFFICE VISIT (OUTPATIENT)
Dept: HEMATOLOGY/ONCOLOGY | Facility: HOSPITAL | Age: 76
End: 2021-01-20
Attending: THORACIC SURGERY (CARDIOTHORACIC VASCULAR SURGERY)
Payer: MEDICARE

## 2021-01-20 VITALS
BODY MASS INDEX: 34 KG/M2 | WEIGHT: 213 LBS | HEART RATE: 71 BPM | DIASTOLIC BLOOD PRESSURE: 81 MMHG | OXYGEN SATURATION: 97 % | RESPIRATION RATE: 18 BRPM | SYSTOLIC BLOOD PRESSURE: 138 MMHG | TEMPERATURE: 98 F

## 2021-01-20 DIAGNOSIS — C34.32 MALIGNANT NEOPLASM OF LOWER LOBE OF LEFT LUNG (HCC): Primary | ICD-10-CM

## 2021-01-20 LAB
ALK (D5F3) BY IHC RESULT: NEGATIVE
BRAF CODON 600 MUTATION DETECT: NOT DETECTED
EGFR BY PYROSEQUENCING RESULT: NOT DETECTED

## 2021-01-20 PROCEDURE — 99211 OFF/OP EST MAY X REQ PHY/QHP: CPT

## 2021-01-21 LAB
ROS1 CELL COUNT: 100
ROS1 FISH RESULT: NEGATIVE

## 2021-01-26 NOTE — PROGRESS NOTES
Thoracic Surgery Postoperative Note       Name: Willa Kirby   Age: 77year old   Sex: female. MRN: [id number]    Subjective:     Chief Complaint: s/p VATS left lower lobectomy  Ms. Isidro Aldana is a 77year old female who presents today in follow up after a Patient Position: Sitting, Cuff Size: large)   Pulse 71   Temp 97.8 °F (36.6 °C) (Tympanic)   Resp 18   Wt 96.6 kg (213 lb)   SpO2 97%   BMI 33.86 kg/m²     Physical Exam:  General: well appearing female in no acute distress  HEENT: Normocephalic, PERRL, E a 77year old female who presents today in follow up after a VATS left lower lobectomy on 1/6/21 with final pathology revealing adenocarcinoma pT1cN0. She is doing well. She denies dyspnea, she denies pain. She denies fevers or chills. No cough.  No hemopty

## 2021-02-06 DIAGNOSIS — Z23 NEED FOR VACCINATION: ICD-10-CM

## 2021-02-09 ENCOUNTER — IMMUNIZATION (OUTPATIENT)
Dept: LAB | Age: 76
End: 2021-02-09
Attending: HOSPITALIST
Payer: MEDICARE

## 2021-02-09 DIAGNOSIS — Z23 NEED FOR VACCINATION: Primary | ICD-10-CM

## 2021-02-09 PROCEDURE — 0001A SARSCOV2 VAC 30MCG/0.3ML IM: CPT

## 2021-03-02 ENCOUNTER — IMMUNIZATION (OUTPATIENT)
Dept: LAB | Age: 76
End: 2021-03-02
Attending: HOSPITALIST
Payer: MEDICARE

## 2021-03-02 DIAGNOSIS — Z23 NEED FOR VACCINATION: Primary | ICD-10-CM

## 2021-03-02 PROCEDURE — 0002A SARSCOV2 VAC 30MCG/0.3ML IM: CPT

## 2021-03-22 PROBLEM — I27.20 PULMONARY HYPERTENSION (HCC): Status: ACTIVE | Noted: 2021-03-22

## 2021-03-22 PROBLEM — I82.411 DVT FEMORAL (DEEP VENOUS THROMBOSIS) WITH THROMBOPHLEBITIS, RIGHT (HCC): Status: ACTIVE | Noted: 2021-03-22

## 2021-03-22 PROBLEM — Q21.12 PFO (PATENT FORAMEN OVALE): Status: ACTIVE | Noted: 2021-03-22

## 2021-03-22 PROBLEM — Z47.89 ORTHOPEDIC AFTERCARE: Status: RESOLVED | Noted: 2020-09-08 | Resolved: 2021-03-22

## 2021-03-22 PROBLEM — I63.9 CEREBROVASCULAR ACCIDENT (CVA) DUE TO EMBOLISM (HCC): Status: ACTIVE | Noted: 2021-03-22

## 2021-03-22 PROBLEM — D69.6 THROMBOCYTOPENIA (HCC): Status: ACTIVE | Noted: 2021-03-22

## 2021-03-22 PROBLEM — Z01.818 PREOP TESTING: Status: RESOLVED | Noted: 2021-01-06 | Resolved: 2021-03-22

## 2021-03-22 PROBLEM — Q21.1 PFO (PATENT FORAMEN OVALE): Status: ACTIVE | Noted: 2021-03-22

## 2021-04-05 PROBLEM — I26.99 ACUTE MASSIVE PULMONARY EMBOLISM (HCC): Status: ACTIVE | Noted: 2021-03-22

## 2021-04-29 PROBLEM — Z79.01 LONG TERM (CURRENT) USE OF ANTICOAGULANTS: Status: ACTIVE | Noted: 2021-04-29

## 2021-04-29 PROBLEM — D68.59 PRIMARY HYPERCOAGULABLE STATE (HCC): Status: ACTIVE | Noted: 2021-04-29

## 2021-05-04 PROBLEM — Z86.718 HISTORY OF DVT (DEEP VEIN THROMBOSIS): Status: ACTIVE | Noted: 2021-03-22

## 2021-05-04 PROBLEM — Z86.711 PERSONAL HISTORY OF PE (PULMONARY EMBOLISM): Status: ACTIVE | Noted: 2021-03-22

## 2021-05-17 DIAGNOSIS — T45.1X5A ANEMIA DUE TO CHEMOTHERAPY: Primary | ICD-10-CM

## 2021-05-17 DIAGNOSIS — D64.81 ANEMIA DUE TO CHEMOTHERAPY: Primary | ICD-10-CM

## 2021-05-17 RX ORDER — DIPHENHYDRAMINE HCL 25 MG
25 CAPSULE ORAL ONCE
Status: CANCELLED | OUTPATIENT
Start: 2021-05-17

## 2021-05-17 RX ORDER — ACETAMINOPHEN 325 MG/1
650 TABLET ORAL ONCE
Status: CANCELLED | OUTPATIENT
Start: 2021-05-17

## 2021-05-18 ENCOUNTER — NURSE ONLY (OUTPATIENT)
Dept: HEMATOLOGY/ONCOLOGY | Facility: HOSPITAL | Age: 76
End: 2021-05-18
Attending: OBSTETRICS & GYNECOLOGY
Payer: MEDICARE

## 2021-05-18 DIAGNOSIS — T45.1X5A ANEMIA DUE TO CHEMOTHERAPY: Primary | ICD-10-CM

## 2021-05-18 DIAGNOSIS — D64.81 ANEMIA DUE TO CHEMOTHERAPY: Primary | ICD-10-CM

## 2021-05-18 PROCEDURE — 86900 BLOOD TYPING SEROLOGIC ABO: CPT

## 2021-05-18 PROCEDURE — 86920 COMPATIBILITY TEST SPIN: CPT

## 2021-05-18 PROCEDURE — 86850 RBC ANTIBODY SCREEN: CPT

## 2021-05-18 PROCEDURE — 86901 BLOOD TYPING SEROLOGIC RH(D): CPT

## 2021-05-18 PROCEDURE — 36591 DRAW BLOOD OFF VENOUS DEVICE: CPT

## 2021-05-18 RX ORDER — DIPHENHYDRAMINE HCL 25 MG
25 CAPSULE ORAL ONCE
Status: CANCELLED | OUTPATIENT
Start: 2021-05-18

## 2021-05-18 RX ORDER — ACETAMINOPHEN 325 MG/1
650 TABLET ORAL ONCE
Status: CANCELLED | OUTPATIENT
Start: 2021-05-18

## 2021-05-18 NOTE — PROGRESS NOTES
Education Record    Learner:  Patient    Disease / Diagnosis: endometrial cancer    Barriers / Limitations:  None   Comments:    Method:  Brief focused   Comments:    General Topics:  Plan of care reviewed and Fall risk and prevention   Comments:    Ronaldoom

## 2021-05-19 ENCOUNTER — OFFICE VISIT (OUTPATIENT)
Dept: HEMATOLOGY/ONCOLOGY | Facility: HOSPITAL | Age: 76
End: 2021-05-19
Attending: OBSTETRICS & GYNECOLOGY
Payer: MEDICARE

## 2021-05-19 VITALS
HEART RATE: 69 BPM | DIASTOLIC BLOOD PRESSURE: 79 MMHG | SYSTOLIC BLOOD PRESSURE: 143 MMHG | OXYGEN SATURATION: 99 % | TEMPERATURE: 98 F | RESPIRATION RATE: 16 BRPM

## 2021-05-19 DIAGNOSIS — T45.1X5A ANEMIA DUE TO CHEMOTHERAPY: Primary | ICD-10-CM

## 2021-05-19 DIAGNOSIS — D64.81 ANEMIA DUE TO CHEMOTHERAPY: Primary | ICD-10-CM

## 2021-05-19 PROCEDURE — 30233N1 TRANSFUSION OF NONAUTOLOGOUS RED BLOOD CELLS INTO PERIPHERAL VEIN, PERCUTANEOUS APPROACH: ICD-10-PCS | Performed by: OBSTETRICS & GYNECOLOGY

## 2021-05-19 PROCEDURE — 36430 TRANSFUSION BLD/BLD COMPNT: CPT

## 2021-05-19 RX ORDER — DIPHENHYDRAMINE HCL 25 MG
25 CAPSULE ORAL ONCE
OUTPATIENT
Start: 2021-05-19

## 2021-05-19 RX ORDER — ACETAMINOPHEN 325 MG/1
650 TABLET ORAL ONCE
OUTPATIENT
Start: 2021-05-19

## 2021-09-02 PROBLEM — D68.59 PRIMARY HYPERCOAGULABLE STATE (HCC): Status: RESOLVED | Noted: 2021-04-29 | Resolved: 2021-09-02

## 2021-09-02 PROBLEM — Z79.01 LONG TERM (CURRENT) USE OF ANTICOAGULANTS: Status: RESOLVED | Noted: 2021-04-29 | Resolved: 2021-09-02

## 2021-09-02 PROBLEM — Z86.718 HISTORY OF DVT (DEEP VEIN THROMBOSIS): Status: RESOLVED | Noted: 2021-03-22 | Resolved: 2021-09-02

## 2022-01-03 PROBLEM — Z86.73 HISTORY OF CEREBROVASCULAR ACCIDENT (CVA) DUE TO EMBOLISM: Status: ACTIVE | Noted: 2021-03-22

## 2022-01-03 PROBLEM — D69.6 THROMBOCYTOPENIA (HCC): Status: RESOLVED | Noted: 2021-03-22 | Resolved: 2022-01-03

## 2022-01-03 PROBLEM — D61.810 ANTINEOPLASTIC CHEMOTHERAPY INDUCED PANCYTOPENIA (CODE) (HCC): Status: ACTIVE | Noted: 2022-01-03

## 2022-01-03 PROBLEM — J96.00 ACUTE RESPIRATORY FAILURE, UNSPECIFIED WHETHER WITH HYPOXIA OR HYPERCAPNIA (HCC): Status: RESOLVED | Noted: 2022-01-03 | Resolved: 2022-01-03

## 2022-01-03 PROBLEM — I26.93 SINGLE SUBSEGMENTAL PULMONARY EMBOLISM WITHOUT ACUTE COR PULMONALE (HCC): Status: RESOLVED | Noted: 2022-01-03 | Resolved: 2022-01-03

## 2022-01-03 PROBLEM — J96.00 ACUTE RESPIRATORY FAILURE, UNSPECIFIED WHETHER WITH HYPOXIA OR HYPERCAPNIA (HCC): Status: ACTIVE | Noted: 2022-01-03

## 2022-01-03 PROBLEM — I26.93 SINGLE SUBSEGMENTAL PULMONARY EMBOLISM WITHOUT ACUTE COR PULMONALE (HCC): Status: ACTIVE | Noted: 2022-01-03

## 2022-01-03 PROBLEM — C34.90 MALIGNANT NEOPLASM OF LUNG, UNSPECIFIED LATERALITY, UNSPECIFIED PART OF LUNG (HCC): Status: ACTIVE | Noted: 2022-01-03

## 2022-01-03 PROBLEM — D61.810 ANTINEOPLASTIC CHEMOTHERAPY INDUCED PANCYTOPENIA (CODE) (HCC): Status: RESOLVED | Noted: 2022-01-03 | Resolved: 2022-01-03

## 2022-01-03 PROBLEM — Z85.118 HISTORY OF LUNG CANCER: Status: ACTIVE | Noted: 2022-01-03

## 2022-03-21 ENCOUNTER — LAB ENCOUNTER (OUTPATIENT)
Dept: LAB | Age: 77
End: 2022-03-21
Attending: ORTHOPAEDIC SURGERY
Payer: MEDICARE

## 2022-03-21 ENCOUNTER — NURSE ONLY (OUTPATIENT)
Dept: LAB | Age: 77
End: 2022-03-21
Attending: ORTHOPAEDIC SURGERY
Payer: MEDICARE

## 2022-03-21 DIAGNOSIS — M16.12 OSTEOARTHRITIS OF LEFT HIP: ICD-10-CM

## 2022-03-21 DIAGNOSIS — Z20.822 ENCOUNTER FOR PREOPERATIVE SCREENING LABORATORY TESTING FOR COVID-19 VIRUS: ICD-10-CM

## 2022-03-21 DIAGNOSIS — Z01.812 ENCOUNTER FOR PREOPERATIVE SCREENING LABORATORY TESTING FOR COVID-19 VIRUS: ICD-10-CM

## 2022-03-21 LAB
CHLORIDE SERPL-SCNC: 105 MMOL/L (ref 98–112)
CO2 SERPL-SCNC: 28 MMOL/L (ref 21–32)
POTASSIUM SERPL-SCNC: 3.8 MMOL/L (ref 3.5–5.1)
SARS-COV-2 RNA RESP QL NAA+PROBE: NOT DETECTED
SODIUM SERPL-SCNC: 140 MMOL/L (ref 136–145)

## 2022-03-21 PROCEDURE — 80051 ELECTROLYTE PANEL: CPT

## 2022-03-21 PROCEDURE — 86850 RBC ANTIBODY SCREEN: CPT

## 2022-03-21 PROCEDURE — 36415 COLL VENOUS BLD VENIPUNCTURE: CPT

## 2022-03-21 PROCEDURE — 86901 BLOOD TYPING SEROLOGIC RH(D): CPT

## 2022-03-21 PROCEDURE — 86900 BLOOD TYPING SEROLOGIC ABO: CPT

## 2022-03-22 LAB
ANTIBODY SCREEN: NEGATIVE
RH BLOOD TYPE: POSITIVE

## 2022-03-23 ENCOUNTER — ANESTHESIA EVENT (OUTPATIENT)
Dept: SURGERY | Facility: HOSPITAL | Age: 77
End: 2022-03-23
Payer: MEDICARE

## 2022-03-24 ENCOUNTER — APPOINTMENT (OUTPATIENT)
Dept: GENERAL RADIOLOGY | Facility: HOSPITAL | Age: 77
End: 2022-03-24
Attending: ORTHOPAEDIC SURGERY
Payer: MEDICARE

## 2022-03-24 ENCOUNTER — HOSPITAL ENCOUNTER (OUTPATIENT)
Facility: HOSPITAL | Age: 77
Discharge: HOME HEALTH | End: 2022-03-25
Attending: ORTHOPAEDIC SURGERY | Admitting: ORTHOPAEDIC SURGERY
Payer: MEDICARE

## 2022-03-24 ENCOUNTER — ANESTHESIA (OUTPATIENT)
Dept: SURGERY | Facility: HOSPITAL | Age: 77
End: 2022-03-24
Payer: MEDICARE

## 2022-03-24 DIAGNOSIS — M16.12 PRIMARY OSTEOARTHRITIS OF LEFT HIP: ICD-10-CM

## 2022-03-24 DIAGNOSIS — I27.20 PULMONARY HYPERTENSION (HCC): ICD-10-CM

## 2022-03-24 DIAGNOSIS — Z20.822 ENCOUNTER FOR PREOPERATIVE SCREENING LABORATORY TESTING FOR COVID-19 VIRUS: Primary | ICD-10-CM

## 2022-03-24 DIAGNOSIS — Z01.812 ENCOUNTER FOR PREOPERATIVE SCREENING LABORATORY TESTING FOR COVID-19 VIRUS: Primary | ICD-10-CM

## 2022-03-24 DIAGNOSIS — M16.12 OSTEOARTHRITIS OF LEFT HIP: ICD-10-CM

## 2022-03-24 PROCEDURE — 88304 TISSUE EXAM BY PATHOLOGIST: CPT | Performed by: ORTHOPAEDIC SURGERY

## 2022-03-24 PROCEDURE — 76000 FLUOROSCOPY <1 HR PHYS/QHP: CPT | Performed by: ORTHOPAEDIC SURGERY

## 2022-03-24 PROCEDURE — 0SRB049 REPLACEMENT OF LEFT HIP JOINT WITH CERAMIC ON POLYETHYLENE SYNTHETIC SUBSTITUTE, CEMENTED, OPEN APPROACH: ICD-10-PCS | Performed by: ORTHOPAEDIC SURGERY

## 2022-03-24 PROCEDURE — 88311 DECALCIFY TISSUE: CPT | Performed by: ORTHOPAEDIC SURGERY

## 2022-03-24 DEVICE — IMPLANTABLE DEVICE
Type: IMPLANTABLE DEVICE | Site: HIP | Status: FUNCTIONAL
Brand: REFOBACIN® BONE CEMENT R

## 2022-03-24 DEVICE — IMPLANTABLE DEVICE
Type: IMPLANTABLE DEVICE | Site: HIP | Status: FUNCTIONAL
Brand: QUIK-USE®

## 2022-03-24 DEVICE — IMPLANTABLE DEVICE
Type: IMPLANTABLE DEVICE | Site: HIP | Status: FUNCTIONAL
Brand: G7® VIVACIT-E®

## 2022-03-24 DEVICE — IMPLANTABLE DEVICE
Type: IMPLANTABLE DEVICE | Site: HIP | Status: FUNCTIONAL
Brand: AVENIR® MÜLLER

## 2022-03-24 DEVICE — IMPLANTABLE DEVICE
Type: IMPLANTABLE DEVICE | Site: HIP | Status: FUNCTIONAL
Brand: G7® ACETABULAR SYSTEM

## 2022-03-24 DEVICE — BIOLOX® DELTA, CERAMIC FEMORAL HEAD, L, Ø 36/+3.5, TAPER 12/14
Type: IMPLANTABLE DEVICE | Site: HIP | Status: FUNCTIONAL
Brand: BIOLOX® DELTA

## 2022-03-24 RX ORDER — CEFAZOLIN SODIUM/WATER 2 G/20 ML
2 SYRINGE (ML) INTRAVENOUS EVERY 8 HOURS
Status: COMPLETED | OUTPATIENT
Start: 2022-03-24 | End: 2022-03-25

## 2022-03-24 RX ORDER — HYDROCODONE BITARTRATE AND ACETAMINOPHEN 5; 325 MG/1; MG/1
1 TABLET ORAL AS NEEDED
Status: DISCONTINUED | OUTPATIENT
Start: 2022-03-24 | End: 2022-03-24 | Stop reason: HOSPADM

## 2022-03-24 RX ORDER — TRANEXAMIC ACID 10 MG/ML
1000 INJECTION, SOLUTION INTRAVENOUS ONCE
Status: COMPLETED | OUTPATIENT
Start: 2022-03-24 | End: 2022-03-24

## 2022-03-24 RX ORDER — TEMAZEPAM 15 MG/1
15 CAPSULE ORAL NIGHTLY PRN
Status: DISCONTINUED | OUTPATIENT
Start: 2022-03-24 | End: 2022-03-25

## 2022-03-24 RX ORDER — KETOROLAC TROMETHAMINE 15 MG/ML
15 INJECTION, SOLUTION INTRAMUSCULAR; INTRAVENOUS EVERY 6 HOURS
Status: DISCONTINUED | OUTPATIENT
Start: 2022-03-24 | End: 2022-03-25

## 2022-03-24 RX ORDER — TRAMADOL HYDROCHLORIDE 50 MG/1
50 TABLET ORAL EVERY 6 HOURS
Status: DISCONTINUED | OUTPATIENT
Start: 2022-03-24 | End: 2022-03-25

## 2022-03-24 RX ORDER — PHENYLEPHRINE HCL 10 MG/ML
VIAL (ML) INJECTION AS NEEDED
Status: DISCONTINUED | OUTPATIENT
Start: 2022-03-24 | End: 2022-03-24 | Stop reason: SURG

## 2022-03-24 RX ORDER — OXYCODONE HYDROCHLORIDE 5 MG/1
2.5 TABLET ORAL EVERY 4 HOURS PRN
Status: DISCONTINUED | OUTPATIENT
Start: 2022-03-24 | End: 2022-03-25

## 2022-03-24 RX ORDER — NALOXONE HYDROCHLORIDE 0.4 MG/ML
80 INJECTION, SOLUTION INTRAMUSCULAR; INTRAVENOUS; SUBCUTANEOUS AS NEEDED
Status: DISCONTINUED | OUTPATIENT
Start: 2022-03-24 | End: 2022-03-24 | Stop reason: HOSPADM

## 2022-03-24 RX ORDER — DIPHENHYDRAMINE HYDROCHLORIDE 50 MG/ML
12.5 INJECTION INTRAMUSCULAR; INTRAVENOUS EVERY 4 HOURS PRN
Status: DISCONTINUED | OUTPATIENT
Start: 2022-03-24 | End: 2022-03-25

## 2022-03-24 RX ORDER — HYDROMORPHONE HYDROCHLORIDE 1 MG/ML
0.2 INJECTION, SOLUTION INTRAMUSCULAR; INTRAVENOUS; SUBCUTANEOUS EVERY 2 HOUR PRN
Status: DISCONTINUED | OUTPATIENT
Start: 2022-03-24 | End: 2022-03-25

## 2022-03-24 RX ORDER — SODIUM CHLORIDE 9 MG/ML
INJECTION, SOLUTION INTRAVENOUS CONTINUOUS
Status: DISCONTINUED | OUTPATIENT
Start: 2022-03-24 | End: 2022-03-25

## 2022-03-24 RX ORDER — SENNOSIDES 8.6 MG
17.2 TABLET ORAL NIGHTLY
Status: DISCONTINUED | OUTPATIENT
Start: 2022-03-24 | End: 2022-03-25

## 2022-03-24 RX ORDER — SODIUM CHLORIDE, SODIUM LACTATE, POTASSIUM CHLORIDE, CALCIUM CHLORIDE 600; 310; 30; 20 MG/100ML; MG/100ML; MG/100ML; MG/100ML
INJECTION, SOLUTION INTRAVENOUS CONTINUOUS
Status: DISCONTINUED | OUTPATIENT
Start: 2022-03-24 | End: 2022-03-24 | Stop reason: HOSPADM

## 2022-03-24 RX ORDER — POLYETHYLENE GLYCOL 3350 17 G/17G
17 POWDER, FOR SOLUTION ORAL DAILY PRN
Status: DISCONTINUED | OUTPATIENT
Start: 2022-03-24 | End: 2022-03-25

## 2022-03-24 RX ORDER — ACETAMINOPHEN 325 MG/1
650 TABLET ORAL 4 TIMES DAILY
Status: DISCONTINUED | OUTPATIENT
Start: 2022-03-24 | End: 2022-03-25

## 2022-03-24 RX ORDER — PROCHLORPERAZINE EDISYLATE 5 MG/ML
10 INJECTION INTRAMUSCULAR; INTRAVENOUS EVERY 6 HOURS PRN
Status: DISCONTINUED | OUTPATIENT
Start: 2022-03-24 | End: 2022-03-25

## 2022-03-24 RX ORDER — CEFAZOLIN SODIUM/WATER 2 G/20 ML
2 SYRINGE (ML) INTRAVENOUS ONCE
Status: COMPLETED | OUTPATIENT
Start: 2022-03-24 | End: 2022-03-24

## 2022-03-24 RX ORDER — DOCUSATE SODIUM 100 MG/1
100 CAPSULE, LIQUID FILLED ORAL 2 TIMES DAILY
Status: DISCONTINUED | OUTPATIENT
Start: 2022-03-24 | End: 2022-03-25

## 2022-03-24 RX ORDER — ACETAMINOPHEN 325 MG/1
650 TABLET ORAL ONCE
Status: COMPLETED | OUTPATIENT
Start: 2022-03-24 | End: 2022-03-24

## 2022-03-24 RX ORDER — DIPHENHYDRAMINE HYDROCHLORIDE 50 MG/ML
25 INJECTION INTRAMUSCULAR; INTRAVENOUS ONCE AS NEEDED
Status: ACTIVE | OUTPATIENT
Start: 2022-03-24 | End: 2022-03-24

## 2022-03-24 RX ORDER — HYDROMORPHONE HYDROCHLORIDE 1 MG/ML
0.4 INJECTION, SOLUTION INTRAMUSCULAR; INTRAVENOUS; SUBCUTANEOUS EVERY 2 HOUR PRN
Status: DISCONTINUED | OUTPATIENT
Start: 2022-03-24 | End: 2022-03-25

## 2022-03-24 RX ORDER — DIPHENHYDRAMINE HCL 25 MG
25 CAPSULE ORAL EVERY 4 HOURS PRN
Status: DISCONTINUED | OUTPATIENT
Start: 2022-03-24 | End: 2022-03-25

## 2022-03-24 RX ORDER — ATORVASTATIN CALCIUM 20 MG/1
20 TABLET, FILM COATED ORAL DAILY
Status: DISCONTINUED | OUTPATIENT
Start: 2022-03-25 | End: 2022-03-25

## 2022-03-24 RX ORDER — DEXAMETHASONE SODIUM PHOSPHATE 10 MG/ML
8 INJECTION, SOLUTION INTRAMUSCULAR; INTRAVENOUS ONCE
Status: COMPLETED | OUTPATIENT
Start: 2022-03-25 | End: 2022-03-25

## 2022-03-24 RX ORDER — SODIUM PHOSPHATE, DIBASIC AND SODIUM PHOSPHATE, MONOBASIC 7; 19 G/133ML; G/133ML
1 ENEMA RECTAL ONCE AS NEEDED
Status: DISCONTINUED | OUTPATIENT
Start: 2022-03-24 | End: 2022-03-25

## 2022-03-24 RX ORDER — EPHEDRINE SULFATE 50 MG/ML
INJECTION INTRAVENOUS AS NEEDED
Status: DISCONTINUED | OUTPATIENT
Start: 2022-03-24 | End: 2022-03-24 | Stop reason: SURG

## 2022-03-24 RX ORDER — SENNA AND DOCUSATE SODIUM 50; 8.6 MG/1; MG/1
1 TABLET, FILM COATED ORAL DAILY
Status: DISCONTINUED | OUTPATIENT
Start: 2022-03-25 | End: 2022-03-25

## 2022-03-24 RX ORDER — PREGABALIN 75 MG/1
75 CAPSULE ORAL DAILY
Status: DISCONTINUED | OUTPATIENT
Start: 2022-03-25 | End: 2022-03-25

## 2022-03-24 RX ORDER — HYDROCODONE BITARTRATE AND ACETAMINOPHEN 5; 325 MG/1; MG/1
2 TABLET ORAL AS NEEDED
Status: DISCONTINUED | OUTPATIENT
Start: 2022-03-24 | End: 2022-03-24 | Stop reason: HOSPADM

## 2022-03-24 RX ORDER — HYDROMORPHONE HYDROCHLORIDE 1 MG/ML
0.4 INJECTION, SOLUTION INTRAMUSCULAR; INTRAVENOUS; SUBCUTANEOUS EVERY 5 MIN PRN
Status: DISCONTINUED | OUTPATIENT
Start: 2022-03-24 | End: 2022-03-24 | Stop reason: HOSPADM

## 2022-03-24 RX ORDER — ACETAMINOPHEN 500 MG
1000 TABLET ORAL ONCE
Status: DISCONTINUED | OUTPATIENT
Start: 2022-03-24 | End: 2022-03-24 | Stop reason: HOSPADM

## 2022-03-24 RX ORDER — MIDAZOLAM HYDROCHLORIDE 1 MG/ML
INJECTION INTRAMUSCULAR; INTRAVENOUS AS NEEDED
Status: DISCONTINUED | OUTPATIENT
Start: 2022-03-24 | End: 2022-03-24 | Stop reason: SURG

## 2022-03-24 RX ORDER — BUPIVACAINE HYDROCHLORIDE 7.5 MG/ML
INJECTION, SOLUTION INTRASPINAL AS NEEDED
Status: DISCONTINUED | OUTPATIENT
Start: 2022-03-24 | End: 2022-03-24 | Stop reason: SURG

## 2022-03-24 RX ORDER — OXYCODONE HYDROCHLORIDE 5 MG/1
5 TABLET ORAL EVERY 4 HOURS PRN
Status: DISCONTINUED | OUTPATIENT
Start: 2022-03-24 | End: 2022-03-25

## 2022-03-24 RX ORDER — SODIUM CHLORIDE, SODIUM LACTATE, POTASSIUM CHLORIDE, CALCIUM CHLORIDE 600; 310; 30; 20 MG/100ML; MG/100ML; MG/100ML; MG/100ML
INJECTION, SOLUTION INTRAVENOUS CONTINUOUS
Status: DISCONTINUED | OUTPATIENT
Start: 2022-03-24 | End: 2022-03-25

## 2022-03-24 RX ORDER — TRANEXAMIC ACID 10 MG/ML
INJECTION, SOLUTION INTRAVENOUS
Status: COMPLETED
Start: 2022-03-24 | End: 2022-03-24

## 2022-03-24 RX ORDER — ONDANSETRON 2 MG/ML
4 INJECTION INTRAMUSCULAR; INTRAVENOUS EVERY 4 HOURS PRN
Status: DISCONTINUED | OUTPATIENT
Start: 2022-03-24 | End: 2022-03-25

## 2022-03-24 RX ORDER — TRANEXAMIC ACID 10 MG/ML
1000 INJECTION, SOLUTION INTRAVENOUS ONCE
Status: DISCONTINUED | OUTPATIENT
Start: 2022-03-24 | End: 2022-03-24 | Stop reason: HOSPADM

## 2022-03-24 RX ORDER — ONDANSETRON 2 MG/ML
4 INJECTION INTRAMUSCULAR; INTRAVENOUS AS NEEDED
Status: DISCONTINUED | OUTPATIENT
Start: 2022-03-24 | End: 2022-03-24 | Stop reason: HOSPADM

## 2022-03-24 RX ORDER — BISACODYL 10 MG
10 SUPPOSITORY, RECTAL RECTAL
Status: DISCONTINUED | OUTPATIENT
Start: 2022-03-24 | End: 2022-03-25

## 2022-03-24 RX ORDER — ACETAMINOPHEN 325 MG/1
TABLET ORAL
Status: COMPLETED
Start: 2022-03-24 | End: 2022-03-24

## 2022-03-24 RX ADMIN — PHENYLEPHRINE HCL 100 MCG: 10 MG/ML VIAL (ML) INJECTION at 15:14:00

## 2022-03-24 RX ADMIN — EPHEDRINE SULFATE 5 MG: 50 INJECTION INTRAVENOUS at 14:47:00

## 2022-03-24 RX ADMIN — TRANEXAMIC ACID 1000 MG: 10 INJECTION, SOLUTION INTRAVENOUS at 13:31:00

## 2022-03-24 RX ADMIN — EPHEDRINE SULFATE 10 MG: 50 INJECTION INTRAVENOUS at 14:24:00

## 2022-03-24 RX ADMIN — PHENYLEPHRINE HCL 100 MCG: 10 MG/ML VIAL (ML) INJECTION at 13:34:00

## 2022-03-24 RX ADMIN — BUPIVACAINE HYDROCHLORIDE 1.5 ML: 7.5 INJECTION, SOLUTION INTRASPINAL at 13:28:00

## 2022-03-24 RX ADMIN — PHENYLEPHRINE HCL 100 MCG: 10 MG/ML VIAL (ML) INJECTION at 14:59:00

## 2022-03-24 RX ADMIN — EPHEDRINE SULFATE 10 MG: 50 INJECTION INTRAVENOUS at 14:19:00

## 2022-03-24 RX ADMIN — SODIUM CHLORIDE, SODIUM LACTATE, POTASSIUM CHLORIDE, CALCIUM CHLORIDE: 600; 310; 30; 20 INJECTION, SOLUTION INTRAVENOUS at 14:43:00

## 2022-03-24 RX ADMIN — CEFAZOLIN SODIUM/WATER 2 G: 2 G/20 ML SYRINGE (ML) INTRAVENOUS at 13:24:00

## 2022-03-24 RX ADMIN — EPHEDRINE SULFATE 5 MG: 50 INJECTION INTRAVENOUS at 13:34:00

## 2022-03-24 RX ADMIN — EPHEDRINE SULFATE 5 MG: 50 INJECTION INTRAVENOUS at 14:34:00

## 2022-03-24 RX ADMIN — PHENYLEPHRINE HCL 100 MCG: 10 MG/ML VIAL (ML) INJECTION at 14:35:00

## 2022-03-24 RX ADMIN — PHENYLEPHRINE HCL 100 MCG: 10 MG/ML VIAL (ML) INJECTION at 13:36:00

## 2022-03-24 RX ADMIN — MIDAZOLAM HYDROCHLORIDE 2 MG: 1 INJECTION INTRAMUSCULAR; INTRAVENOUS at 13:11:00

## 2022-03-24 RX ADMIN — SODIUM CHLORIDE, SODIUM LACTATE, POTASSIUM CHLORIDE, CALCIUM CHLORIDE: 600; 310; 30; 20 INJECTION, SOLUTION INTRAVENOUS at 15:54:00

## 2022-03-24 RX ADMIN — PHENYLEPHRINE HCL 100 MCG: 10 MG/ML VIAL (ML) INJECTION at 14:19:00

## 2022-03-24 NOTE — INTERVAL H&P NOTE
Pre-op Diagnosis: Primary osteoarthritis of left hip [M16.12]    The above referenced H&P was reviewed by Corina Elizalde MD on 3/24/2022, the patient was examined and no significant changes have occurred in the patient's condition since the H&P was performed. I discussed with the patient and/or legal representative the potential benefits, risks and side effects of this procedure; the likelihood of the patient achieving goals; and potential problems that might occur during recuperation. I discussed reasonable alternatives to the procedure, including risks, benefits and side effects related to the alternatives and risks related to not receiving this procedure. We will proceed with procedure as planned.

## 2022-03-24 NOTE — ANESTHESIA PROCEDURE NOTES
Spinal Block  Performed by: Jamaal Calderón DO  Authorized by: Jamaal Calderón DO       General Information and Staff    Start Time:  3/24/2022 10:18 AM  End Time:  3/24/2022 10:28 AM  Anesthesiologist:  Jamaal Calderón DO  Performed by:   Anesthesiologist  Patient Location:  OR  Site identification: surface landmarks    Preanesthetic Checklist: patient identified, IV checked, risks and benefits discussed, monitors and equipment checked, pre-op evaluation, timeout performed, anesthesia consent and sterile technique used      Procedure Details    Patient Position:  Sitting  Prep: ChloraPrep    Monitoring:  Cardiac monitor, heart rate and continuous pulse ox  Approach:  Midline  Location:  L3-4  Injection Technique:  Single-shot    Needle    Needle Type:  Sprotte  Needle Gauge:  24 G  Needle Length:  3.5 in    Assessment    Sensory Level:   Events: clear CSF, CSF aspirated, well tolerated and blood negative     Additional Comments

## 2022-03-24 NOTE — OPERATIVE REPORT
OPERATIVE REPORT    Facility:  [address] [place] [address]    Patient Name:  Bubba Balderas    Age/Gender:  77year old female  :  1945    MRN:  [id number]    Date of Operation:  3/24/2022    Preoperative Diagnosis:   LEFT HIP OSTEOARTHRITIS    Postoperative Diagnosis:   LEFT HIP OSTEOARTHRITIS    Procedure Performed:  LEFT TOTAL HIP ARTHROPLASTY    Surgeon:  GABY Mancera M.D. Assistant:   CECY Aleman       Anesthesia:  EPIDURAL    Estimated Blood Loss:  150cc    Drain:  NONE    Complications:  NONE    Implants:   1. Biomet G7 acetabular shell, size 54 mm   2. Neutral E1 highly cross-linked polyethylene liner   3. Vin Avenir cemented femoral stem, size 6, std offset   4.  36 mm, +3.5 Delta ceramic femoral head      Indications for Procedure:  Bubba Balderas is a 77year old female with osteoarthritis of the left hip who failed conservative management. After consultation in the office and discussion regarding risks and benefits of surgical treatment, surgery was scheduled and informed consent obtained. Description of Procedure: The patient was taken to the operating room after the correct surgical site was marked in the preop holding area. The patient was placed under anesthesia and placed in a supine position on the [facility] orthopaedic table. Preoperative antibiotics were given. All bony prominences were padded. The left hip was prepped and draped in usual, sterile surgical fashion. Bony landmarks were palpated for incision and a direct anterior approach was performed to the hip between the interval of tensor fascia raoul and sartorius/rectus femoris. Lateral femoral circumflex vessels were identified, isolated and cauterized. An L-shaped capsulotomy was performed and the capsule was tagged for retraction. Retractors were placed inside the hip capsule and further capsular release was performed inside the saddle of the femur as well as down the medial aspect of the femoral neck. Osteotomy of the femoral neck was performed using a sagittal saw. A corkscrew was used to remove the femoral head. Retractors were placed anterior and posterior to the acetabulum. The hip labrum as well as the soft tissue in the cotyloid fossa were removed in their entirety. Reaming was then performed using fluoroscopy to assess depth, anteversion and abduction angle. The acetabular shell was then inserted again using fluoroscopy to assess abduction angle, anteversion and complete seating of the acetabular component. A neutral polyethylene was then inserted and impacted without difficulty. Attention was then turned to the femur where the femoral hook was placed around the femur just distal to the vastus tubercle and proximal to the tendon of the gluteus marco. The leg was then externally rotated, extended and adducted with traction completely released. The mechanical lift arm on the table was used to hold the proximal femur carefully. Capsule was then further released inside the trochanter until the entire inside of the greater trochanter was easily visualized. The lateral femoral neck remnant was removed and the femur was broached. After broaching up to a proper size, trial head and neck components were used to determine stem offset and head length. Fluoroscopy was used to verify the position of the broach as well as the length and offset to determine final implant positions. The trials were removed and the canal was prepared for cement. A cement restrictor was placed in the canal at an appropriate depth and cement was mixed and loaded into the cement gun. The canal was irrigated and suctioned and filled with cement. The stem was inserted and cement allowed to dry. The head was impacted onto the stem. There were no complications with insertion of the femoral components. The hip was then reduced under direct visualization.   Fluoroscopy again verified length, offset, implant position and stem fill of the femoral canal.  Fluoro also verified there were no iatrogenic fractures. The wound was copiously irrigated and the tag sutures in the capsule were tied together closing the anterior hip capsule. Local anesthetic and pain medicine was injected into the hip capsule and surrounding soft tissues. The fascia raoul was closed with absorbable suture. Skin was then closed in 2 layers with absorbable suture. Dermabond was applied to the wound and allowed to dry before sterile dressings were applied. There were no complications and the procedure went as planned. The surgical assistant was present for the entire procedure and assisted with the approach, exposure, definitive surgery and closure. The patient left the operating room in stable condition. GABY Eduardo M.D.

## 2022-03-25 ENCOUNTER — APPOINTMENT (OUTPATIENT)
Dept: GENERAL RADIOLOGY | Facility: HOSPITAL | Age: 77
End: 2022-03-25
Attending: ORTHOPAEDIC SURGERY
Payer: MEDICARE

## 2022-03-25 VITALS
SYSTOLIC BLOOD PRESSURE: 121 MMHG | DIASTOLIC BLOOD PRESSURE: 59 MMHG | WEIGHT: 198 LBS | HEIGHT: 66.5 IN | OXYGEN SATURATION: 94 % | RESPIRATION RATE: 18 BRPM | BODY MASS INDEX: 31.44 KG/M2 | TEMPERATURE: 98 F | HEART RATE: 84 BPM

## 2022-03-25 PROBLEM — Z20.822 ENCOUNTER FOR PREOPERATIVE SCREENING LABORATORY TESTING FOR COVID-19 VIRUS: Status: ACTIVE | Noted: 2021-01-06

## 2022-03-25 PROBLEM — Z01.812 ENCOUNTER FOR PREOPERATIVE SCREENING LABORATORY TESTING FOR COVID-19 VIRUS: Status: ACTIVE | Noted: 2021-01-06

## 2022-03-25 PROBLEM — Z11.52 ENCOUNTER FOR PREOPERATIVE SCREENING LABORATORY TESTING FOR COVID-19 VIRUS: Status: ACTIVE | Noted: 2021-01-06

## 2022-03-25 LAB
ANION GAP SERPL CALC-SCNC: 5 MMOL/L (ref 0–18)
BASOPHILS # BLD AUTO: 0.02 X10(3) UL (ref 0–0.2)
BUN BLD-MCNC: 15 MG/DL (ref 7–18)
CALCIUM BLD-MCNC: 8.9 MG/DL (ref 8.5–10.1)
CHLORIDE SERPL-SCNC: 104 MMOL/L (ref 98–112)
CO2 SERPL-SCNC: 29 MMOL/L (ref 21–32)
CREAT BLD-MCNC: 0.89 MG/DL
EOSINOPHIL # BLD AUTO: 0.05 X10(3) UL (ref 0–0.7)
EOSINOPHIL NFR BLD AUTO: 0.6 %
ERYTHROCYTE [DISTWIDTH] IN BLOOD BY AUTOMATED COUNT: 14.6 %
GLUCOSE BLD-MCNC: 109 MG/DL (ref 70–99)
HCT VFR BLD AUTO: 29 %
HGB BLD-MCNC: 9.3 G/DL
IMM GRANULOCYTES # BLD AUTO: 0.03 X10(3) UL (ref 0–1)
IMM GRANULOCYTES NFR BLD: 0.3 %
LYMPHOCYTES # BLD AUTO: 0.4 X10(3) UL (ref 1–4)
LYMPHOCYTES NFR BLD AUTO: 4.5 %
MCH RBC QN AUTO: 30.8 PG (ref 26–34)
MCHC RBC AUTO-ENTMCNC: 32.1 G/DL (ref 31–37)
MCV RBC AUTO: 96 FL
MONOCYTES # BLD AUTO: 0.36 X10(3) UL (ref 0.1–1)
MONOCYTES NFR BLD AUTO: 4.1 %
NEUTROPHILS # BLD AUTO: 7.95 X10(3) UL (ref 1.5–7.7)
NEUTROPHILS NFR BLD AUTO: 90.3 %
OSMOLALITY SERPL CALC.SUM OF ELEC: 287 MOSM/KG (ref 275–295)
PLATELET # BLD AUTO: 169 10(3)UL (ref 150–450)
POTASSIUM SERPL-SCNC: 4 MMOL/L (ref 3.5–5.1)
RBC # BLD AUTO: 3.02 X10(6)UL
SODIUM SERPL-SCNC: 138 MMOL/L (ref 136–145)
WBC # BLD AUTO: 8.8 X10(3) UL (ref 4–11)

## 2022-03-25 PROCEDURE — 85025 COMPLETE CBC W/AUTO DIFF WBC: CPT | Performed by: HOSPITALIST

## 2022-03-25 PROCEDURE — 97535 SELF CARE MNGMENT TRAINING: CPT

## 2022-03-25 PROCEDURE — 97161 PT EVAL LOW COMPLEX 20 MIN: CPT

## 2022-03-25 PROCEDURE — 80048 BASIC METABOLIC PNL TOTAL CA: CPT | Performed by: HOSPITALIST

## 2022-03-25 PROCEDURE — 97165 OT EVAL LOW COMPLEX 30 MIN: CPT

## 2022-03-25 PROCEDURE — 73501 X-RAY EXAM HIP UNI 1 VIEW: CPT | Performed by: ORTHOPAEDIC SURGERY

## 2022-03-25 PROCEDURE — 97116 GAIT TRAINING THERAPY: CPT

## 2022-03-25 NOTE — PLAN OF CARE
Pt A&Ox4, VSS on room air. Aquacel to L hip C/D/I. SCDs to BLE, spandagrip to LLE. Denies pain. Tolerating regular diet. Voiding w/o difficulty; LBM 3/24. Plan for therapy to see. POC discussed w/ pt and verbalized understanding. Safety precautions in place. Will continue to monitor.

## 2022-03-25 NOTE — CM/SW NOTE
03/25/22 1000   CM/SW Referral Data   Referral Source Social Work (self-referral)   Reason for Referral Discharge planning   Informant EMR;Clinical Staff Member   Discharge Needs   Anticipated D/C needs Home health care       Patient is a 76 y/o woman admitted s/p ALISHA. Pt with pre-operative plan for [facility] [place]. PT recommending [place] [id number] services at discharge. [employer] [facility] [place] confirmed pt accepted for [place] [id number] services [place]. No other [place] needs/concerns identified at this time. / to remain available for support and/or discharge planning.      Dionisio Sellers, [facility]  Discharge Planner  [phone]

## 2022-03-25 NOTE — PLAN OF CARE
Left hip dressing clean, dry, intact. Cap refill < 3 seconds, moves all extremities. Denies pain. States ready to go home today. Has viewed discharge video. Instructed on plan of care, call don't fall protocol, call for all asst needed, discomfort felt. Verbalized understanding. Safety precautions maintained.

## 2022-03-25 NOTE — PLAN OF CARE
Patient viewed discharge instruction video. Verbalized understanding. Verbal and written discharge instructions reviewed with her and spouse. Both verbalized understanding. Discharged via wheel chair, accompanied by transport staff. States has medications already at home & xarelto electronically prescribed.

## 2022-03-25 NOTE — PROGRESS NOTES
Reviewed indications, side effects of pain medication/narcotics and constipation prevention. Stressed importance of increased fluids/roughage in diet, continued use of stool softeners along with laxatives and suppositories as needed while taking narcotics even when at home. Pt verbalized understanding. Teachback done on ankle pumps and incentive spirometry use 10 times every hour w/a for each. Reviewed dressing changes, showering, any restrictions and cold therapy before. Patient will watch discharge education video after her phone call. Reviewed discharge plan with patient. Solicited and answered any questions regarding care. Clarifications on anticoagulant needed. RN aware.

## 2022-03-25 NOTE — PLAN OF CARE
NURSING ADMISSION NOTE      Patient admitted via bed  Oriented to room. Safety precautions initiated. Bed in low position. Call light in reach. Dressing to left hip clean, dry, intact. Denies pain. Ivf infusing. Due to void. Instructed on plan of care, call don't fall protocol, call for all asst needed, discomfort felt. Verbalized understanding.

## 2022-03-25 NOTE — PROGRESS NOTES
RN and patient made aware clarification by Dr. Carlos Grant : patient to be on Xeralto post op as ordered. Patient verbalized understanding.

## 2022-03-25 NOTE — PROGRESS NOTES
Reviewed indications, side effects of pain medication/narcotics and constipation prevention. Stressed importance of increased fluids/roughage in diet, continued use of stool softeners along with laxatives and suppositories as needed while taking narcotics even when at home. Pt verbalized understanding. Teachback done on ankle pumps and incentive spirometry use 10 times every hour w/a for each. Will review spandagrip dressing changes, showering, any restrictions and cold therapy along with discharge education video once patient's wife arrives. He will notify us when she is here. Reviewed discharge plan with patient. Solicited and answered any questions regarding care.

## 2023-10-16 RX ORDER — ACETAMINOPHEN 325 MG/1
325 TABLET ORAL EVERY 6 HOURS PRN
COMMUNITY

## 2023-10-17 ENCOUNTER — HOSPITAL ENCOUNTER (OUTPATIENT)
Facility: HOSPITAL | Age: 78
Setting detail: HOSPITAL OUTPATIENT SURGERY
Discharge: HOME | End: 2023-10-17
Attending: INTERNAL MEDICINE | Admitting: INTERNAL MEDICINE
Payer: MEDICARE

## 2023-10-17 PROCEDURE — 99152 MOD SED SAME PHYS/QHP 5/>YRS: CPT | Performed by: INTERNAL MEDICINE

## 2023-10-17 PROCEDURE — 0D5P8ZZ DESTRUCTION OF RECTUM, VIA NATURAL OR ARTIFICIAL OPENING ENDOSCOPIC: ICD-10-PCS | Performed by: INTERNAL MEDICINE

## 2023-10-17 PROCEDURE — 99153 MOD SED SAME PHYS/QHP EA: CPT | Performed by: INTERNAL MEDICINE

## 2023-10-17 RX ORDER — SODIUM CHLORIDE 0.9 % (FLUSH) 0.9 %
10 SYRINGE (ML) INJECTION AS NEEDED
Status: DISCONTINUED | OUTPATIENT
Start: 2023-10-17 | End: 2023-10-17

## 2023-10-17 RX ORDER — MIDAZOLAM HYDROCHLORIDE 1 MG/ML
INJECTION INTRAMUSCULAR; INTRAVENOUS
Status: DISCONTINUED | OUTPATIENT
Start: 2023-10-17 | End: 2023-10-17

## 2023-10-17 RX ORDER — SODIUM CHLORIDE, SODIUM LACTATE, POTASSIUM CHLORIDE, CALCIUM CHLORIDE 600; 310; 30; 20 MG/100ML; MG/100ML; MG/100ML; MG/100ML
INJECTION, SOLUTION INTRAVENOUS CONTINUOUS
Status: DISCONTINUED | OUTPATIENT
Start: 2023-10-17 | End: 2023-10-17

## 2023-10-17 RX ORDER — MIDAZOLAM HYDROCHLORIDE 1 MG/ML
1 INJECTION INTRAMUSCULAR; INTRAVENOUS EVERY 5 MIN PRN
Status: DISCONTINUED | OUTPATIENT
Start: 2023-10-17 | End: 2023-10-17

## 2023-10-17 NOTE — OPERATIVE REPORT
Sigmoidoscopy Operative Report    Silverio Rodriguez Patient Status:  [facility] Outpatient Surgery    1945 MRN [id number]   Location [facility] ENDOSCOPY LAB SUITES Attending Bethanie Samuel MD   Hosp Day #   0 PCP Sherren Marus, MD     Pre-Operative Diagnosis: Acute radiation proctitis/ Rectal bleeding / Diarrhea, unspecified type    Post-Operative Diagnosis:  Radiation Proctitis s/p APC  Internal Hemorrhoids    Procedure Performed: SIGMOIDOSCOPY w/Control of Bleeding    Informed Consent: Informed consent for both the procedure and sedation were obtained from the patient. The potentially life-threatening complications of sedation, bleeding,  perforation, transfusion or repeat endoscopy  were reviewed along with the possible need for hospitalization, surgical management, transfusion or repeat endoscopy should one of these complications arise. The patient understands and is agreeable to proceed. Sedation Type: Conscious Sedation- 6 mg of Versed, 100 mcg of Fentanyl given in divided doses as per protocol  Moderate Sedation Time: 21 Minutes  A trained sedation nurse was present to assist in monitoring the patient during the entire length of moderate sedation time. Cecum Withdrawal Time:  NA  Date of previous colonoscopy: 2023    Procedure Description: The patient was placed in the left lateral decubitus position. After careful digital rectal examination, the Pediatric colonoscope was inserted into the rectum and advanced to the level of the descending colon under direct visualization. Careful examination of the colon was performed during withdrawal of the endoscope. The scope was withdrawn to the rectum and retroflexion was performed. The patient tolerated the procedure well with no immediate complications. The patient was transferred to the recovery area in stable condition.   Quality of Preparation: Adequate    Findings:   Colon:  Normal appearing colon mucosa. 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 APC cautery was applied to all visible vascular lesions. No bleeding noted after APC. - Small internal hemorrhoids seen on slow withdrawal.    Recommendations:   - Resume Xarelto in 2 days  - Hold off on Sucralfate enemas at this time  - Call with update in 2 weeks    Discharge: The patient was given an after visit summary detailing the procedure, findings, recommendations and follow up plans.      Petar Ford MD  10/17/2023  11:55 AM

## 2023-10-17 NOTE — DISCHARGE INSTRUCTIONS
ENDOSCOPY DISCHARGE INSTRUCTIONS    Procedure Performed:   Flexible Sigmoidoscopy    Endoscopist: No name on file  FINDINGS:   Actively bleeding radiation proctitis as expected. Cautery of these vessels was successful. MEDICATIONS:  You may resume all other medications today    DIET:  Resume Normal Diet    BIOPSIES:  No biopsies were taken    X-RAYS/LABS:   No X-rays/Labs were ordered today    ADDITIONAL RECOMMENDATIONS:    - Resume Xarelto in 2 days  - Hold off on Sucralfate enemas at this time  - Call with update in 2 weeks    Activity for remainder of today:    REST TODAY  DO NOT drive or operate heavy machinery  DO NOT drink any alcoholic beverages  DO NOT sign any legal documents or make any important decisions    After your procedure(s): It is not unusual to feel bloated or gassy . Passing gas and belching is encouraged. Lying on your left side with your knees flexed may relieve the discomfort. A hot pack to the abdomen may also help. After your gastroscopy (upper endoscopy): You may experience a slight sore throat which will subside. Throat lozenges or salt water gargle can be used.     FOLLOW-UP:  Contact the office at [phone] for follow-up appointment is needed or if you develop any of the following:    Severe abdominal pain/discomfort     Excessive bleeding                     Black tarry stool    Difficulty breathing/swallowing      Persistent nausea/vomiting  Fever above 100 degrees or chills

## 2023-10-18 VITALS
BODY MASS INDEX: 33.19 KG/M2 | HEART RATE: 80 BPM | HEIGHT: 66.5 IN | OXYGEN SATURATION: 97 % | SYSTOLIC BLOOD PRESSURE: 123 MMHG | WEIGHT: 209 LBS | DIASTOLIC BLOOD PRESSURE: 62 MMHG | RESPIRATION RATE: 20 BRPM

## 2024-07-17 NOTE — LETTER
[address] [facility] FOR BLOOD OR BLOOD COMPONENTS      In the course of your treatment, it may become necessary to administer a transfusion of blood or blood components.  This form provides basic information concerning this proc If loss of blood poses serious threats in the course of your treatment, THERE IS NO EFFECTIVE ALTERNATIVE TO BLOOD TRANSFUSION.  However, if you have any further questions on this matter, your physician will fully explain the alternatives to you if it has n [[email or web address]_OB_FT:[email or web address]],[NS_DeliveryRN_OBGYN_ALL_OB_FT:[email or web address]=],[NS_DeliveryAssist1_OBGYN_ALL_OB_FT:[id number]=]

## (undated) DEVICE — ZIMMER® STERILE DISPOSABLE TOURNIQUET CUFF WITH PLC, DUAL PORT, SINGLE BLADDER, 34 IN. (86 CM)

## (undated) DEVICE — MEDI-VAC NON-CONDUCTIVE SUCTION TUBING 6MM X 1.8M (6FT.) L: Brand: CARDINAL HEALTH

## (undated) DEVICE — SHEET,DRAPE,70X100,STERILE: Brand: MEDLINE

## (undated) DEVICE — THE ECHELON, ECHELON ENDOPATH™ AND ECHELON FLEX™ FAMILIES OF ENDOSCOPIC LINEAR CUTTERS AND RELOADS ARE STERILE, SINGLE PATIENT USE INSTRUMENTS THAT SIMULTANEOUSLY CUT AND STAPLE TISSUE. THERE ARE SIX STAGGERED ROWS OF STAPLES, THREE ON EITHER SIDE OF THE CUT LINE. THE 45 MM INSTRUMENTS HAVE A STAPLE LINE THATIS APPROXIMATELY 45 MM LONG AND A CUT LINE THAT IS APPROXIMATELY 42 MM LONG. THE SHAFT CAN ROTATE FREELY IN BOTH DIRECTIONS AND AN ARTICULATION MECHANISM ON ARTICULATING INSTRUMENTS ENABLES BENDING THE DISTAL PORTIONOF THE SHAFT TO FACILITATE LATERAL ACCESS OF THE OPERATIVE SITE.THE INSTRUMENTS ARE SHIPPED WITHOUT A RELOAD AND MUST BE LOADED PRIOR TO USE. A STAPLE RETAINING CAP ON THE RELOAD PROTECTS THE STAPLE LEG POINTS DURING SHIPPING AND TRANSPORTATION. THE INSTRUMENTS’ LOCK-OUT FEATURE IS DESIGNED TO PREVENT A USED RELOAD FROM BEING REFIRED.: Brand: ECHELON ENDOPATH

## (undated) DEVICE — STOCK ORTH TUB 72X8IN NLTX

## (undated) DEVICE — 1016 S-DRAPE IRRIG POUCH 10/BOX: Brand: STERI-DRAPE™

## (undated) DEVICE — SPECIMEN CONTAINER,POSITIVE SEAL INDICATOR, OR PACKAGED: Brand: PRECISION

## (undated) DEVICE — POSITIONER OR KT PT CR

## (undated) DEVICE — CATH DRAIN 28F HYDRAGLIDE XL

## (undated) DEVICE — DECANTER BAG 9": Brand: MEDLINE INDUSTRIES, INC.

## (undated) DEVICE — STERILE SYNTHETIC POLYISOPRENE POWDER-FREE SURGICAL GLOVES WITH HYDROGEL COATING, SMOOTH FINISH, STRAIGHT FINGER: Brand: PROTEXIS

## (undated) DEVICE — STERILE POLYISOPRENE POWDER-FREE SURGICAL GLOVES: Brand: PROTEXIS

## (undated) DEVICE — FIAPC® PROBE W/ FILTER 2200 A OD 2.3MM/6.9FR; L 2.2M/7.2FT: Brand: ERBE

## (undated) DEVICE — SUTURE FIBERWIRE S AR-7200

## (undated) DEVICE — STANDARD HYPODERMIC NEEDLE,POLYPROPYLENE HUB: Brand: MONOJECT

## (undated) DEVICE — BOWL CEMENT MIX QUICK-VAC

## (undated) DEVICE — KENDALL SCD EXPRESS SLEEVES, KNEE LENGTH, MEDIUM: Brand: KENDALL SCD

## (undated) DEVICE — ANCHOR TISSUE RETRIEVAL SYSTEM, BAG SIZE 1200 ML, PORT SIZE 15 MM: Brand: ANCHOR TISSUE RETRIEVAL SYSTEM

## (undated) DEVICE — ABSORBABLE HEMOSTAT (OXIDIZED REGENERATED CELLULOSE, U.S.P.): Brand: SURGICEL

## (undated) DEVICE — 2T11 #2 PDO 36 X 36: Brand: 2T11 #2 PDO 36 X 36

## (undated) DEVICE — GOWN,SIRUS,FABRIC-REINFORCED,X-LARGE: Brand: MEDLINE

## (undated) DEVICE — EXOFIN TISSUE ADHESIVE 1.0ML

## (undated) DEVICE — NEEDLE SPINAL 18X3-1/2 PINK.

## (undated) DEVICE — NON-ADHERENT PAD PREPACK: Brand: TELFA

## (undated) DEVICE — THE ECHELON FLEX POWERED PLUS ARTICULATING ENDOSCOPIC LINEAR CUTTERS ARE STERILE, SINGLE PATIENT USE INSTRUMENTS THAT SIMULTANEOUSLYCUT AND STAPLE TISSUE. THERE ARE SIX STAGGERED ROWS OF STAPLES, THREE ON EITHER SIDE OF THE CUT LINE. THE ECHELON FLEX 45 POWERED PLUSINSTRUMENTS HAVE A STAPLE LINE THAT IS APPROXIMATELY 45 MM LONG AND A CUT LINE THAT IS APPROXIMATELY 42 MM LONG. THE SHAFT CAN ROTATE FREELYIN BOTH DIRECTIONS AND AN ARTICULATION MECHANISM ENABLES THE DISTAL PORTION OF THE SHAFT TO PIVOT TO FACILITATE LATERAL ACCESS TO THE OPERATIVESITE.THE INSTRUMENTS ARE PACKAGED WITH A PRIMARY LITHIUM BATTERY PACK THAT MUST BE INSTALLED PRIOR TO USE. THERE ARE SPECIFIC REQUIREMENTS FORDISPOSING OF THE BATTERY PACK. REFER TO THE BATTERY PACK DISPOSAL SECTION.THE INSTRUMENTS ARE PACKAGED WITHOUT A RELOAD AND MUST BE LOADED PRIOR TO USE. A STAPLE RETAINING CAP ON THE RELOAD PROTECTS THE STAPLE LEGPOINTS DURING SHIPPING AND TRANSPORTATION. THE INSTRUMENTS’ LOCK-OUT FEATURE IS DESIGNED TO PREVENT A USED OR IMPROPERLY INSTALLED RELOADFROM BEING REFIRED OR AN INSTRUMENT FROM BEING FIRED WITHOUT A RELOAD.: Brand: ECHELON FLEX

## (undated) DEVICE — LIGAMAX 5 MM ENDOSCOPIC MULTIPLE CLIP APPLIER: Brand: LIGAMAX

## (undated) DEVICE — REM POLYHESIVE INFANT PATIENT RETURN ELECTRODE: Brand: VALLEYLAB

## (undated) DEVICE — SUTURE VICRYL 2-0 CP-1

## (undated) DEVICE — Device: Brand: STABLECUT®

## (undated) DEVICE — SOL  .9 3000ML

## (undated) DEVICE — KIT ENDO ORCAPOD 160/180/190

## (undated) DEVICE — STERILE POLYISOPRENE POWDER-FREE SURGICAL GLOVES WITH EMOLLIENT COATING: Brand: PROTEXIS

## (undated) DEVICE — SUTURE MONOCRYL 4-0 PS-2

## (undated) DEVICE — SYRINGE 30ML LL TIP

## (undated) DEVICE — SUTURE VICRYL 2-0 FSL

## (undated) DEVICE — STERILE LATEX POWDER-FREE SURGICAL GLOVES WITH HYDROGEL COATING, SMOOTH FINISH, STRAIGHT FINGER: Brand: PROTEXIS

## (undated) DEVICE — BIPOLAR SEALER 23-112-1 AQM 6.0: Brand: AQUAMANTYS™

## (undated) DEVICE — WRAP COOLING KNEE W/ICE PILLOW

## (undated) DEVICE — Device

## (undated) DEVICE — KIT CLEAN ENDOKIT 1.1OZ GOWNX2

## (undated) DEVICE — MLPD DISPOSABLE PAD (6' ROLL) 3 ROLLS: Brand: SCHAERER MEDICAL USA

## (undated) DEVICE — CV PACK-LF: Brand: MEDLINE INDUSTRIES, INC.

## (undated) DEVICE — SOLUTION ENDOSCOPIC ANTI-FOG NON-TOXIC NON-ABRASIVE 6 CUBIC CENTIMETER WITH RADIOPAQUE ADHESIVE-BACKED SPONGE STERILE NOT MADE WITH NATURAL RUBBER LATEX MEDICHOICE: Brand: MEDICHOICE

## (undated) DEVICE — SUTURE VICRYL 2-0 CT-1

## (undated) DEVICE — Device: Brand: DUAL NARE NASAL CANNULAE FEMALE LUER CON 7FT O2 TUBE

## (undated) DEVICE — WRAP COOLING HIP W/ICE PILLOW

## (undated) DEVICE — ARYGLE SUCTION CATHETER WITH CHIMNEY VALVE STRIAGHT PACKED 14 FR/ CH: Brand: ARGYLE

## (undated) DEVICE — CAUTERY TIP BLADE EXTENSION

## (undated) DEVICE — ENDOPATH ECHELON VASCULAR  RELOADS, WHITE, 35MM: Brand: ECHELON ENDOPATH

## (undated) DEVICE — LIGHT HANDLE

## (undated) DEVICE — BLADE ELECTRODE: Brand: EDGE

## (undated) DEVICE — SUTURE SILK 0 FSL

## (undated) DEVICE — SUTURE MONOCRYL 3-0 PS-2

## (undated) DEVICE — 1010 S-DRAPE TOWEL DRAPE 10/BX: Brand: STERI-DRAPE™

## (undated) DEVICE — SKIN AFFIX .4ML

## (undated) DEVICE — SOL H2O 1000ML BTL

## (undated) DEVICE — GAUZE SPONGES,USP TYPE VII GAUZE, 12 PLY: Brand: CURITY

## (undated) DEVICE — SOL  .9 1000ML BTL

## (undated) DEVICE — 3M™ IOBAN™ 2 ANTIMICROBIAL INCISE DRAPE 6650EZ: Brand: IOBAN™ 2

## (undated) DEVICE — 3M™ STERI-DRAPE™ INSTRUMENT POUCH 1018: Brand: STERI-DRAPE™

## (undated) DEVICE — SUTURE STRATAFIX PDS PLUS 45CM

## (undated) DEVICE — GLOVE SURG TRIUMPH SZ 8

## (undated) DEVICE — ECHELON FLEX  POWERED VASCULAR STAPLER WITH ADVANCED PLACEMENT TIP, 35MM: Brand: ECHELON FLEX

## (undated) DEVICE — SYRINGE 10ML LL TIP

## (undated) DEVICE — DRAIN CHEST DRY ADULT/PED

## (undated) DEVICE — ANTERIOR HIP: Brand: MEDLINE INDUSTRIES, INC.

## (undated) DEVICE — TOTAL KNEE CDS: Brand: MEDLINE INDUSTRIES, INC.

## (undated) DEVICE — BANDAGE ROLL,100% COTTON, 6 PLY, LARGE: Brand: KERLIX

## (undated) DEVICE — COVER,BOOT,FOAM,NON-SKID,HOOK-LOOP,XLG: Brand: MEDLINE INDUSTRIES, INC.

## (undated) DEVICE — MEDI-VAC NON-CONDUCTIVE SUCTION TUBING: Brand: CARDINAL HEALTH

## (undated) DEVICE — Device: Brand: POWER-FLO®

## (undated) DEVICE — HOOD, PEEL-AWAY: Brand: FLYTE

## (undated) DEVICE — CHLORAPREP 26ML APPLICATOR

## (undated) DEVICE — UNIVERSAL STERIBUMP® STERILE (5/CASE): Brand: UNIVERSAL STERIBUMP®

## (undated) DEVICE — 60 ML SYRINGE REGULAR TIP: Brand: MONOJECT

## (undated) DEVICE — WOUND RETRACTOR AND PROTECTOR: Brand: ALEXIS WOUND PROTECTOR-RETRACTOR

## (undated) DEVICE — SUTURE VICRYL 0 CP-1

## (undated) NOTE — LETTER
[address] [facility] FOR BLOOD OR BLOOD COMPONENTS      In the course of your treatment, it may become necessary to administer a transfusion of blood or blood components.  This form provides basic information concerning this proc If loss of blood poses serious threats in the course of your treatment, THERE IS NO EFFECTIVE ALTERNATIVE TO BLOOD TRANSFUSION.  However, if you have any further questions on this matter, your physician will fully explain the alternatives to you if it has n

## (undated) NOTE — LETTER
[address]  Authorization for Surgical Operation and Procedure                                                                                           I hereby authorize Yoli Hankins MD, my physician and his/her assistants (if applicable), which may include medical students, residents, and/or fellows, to perform the following surgical operation/ procedure and administer such anesthesia as may be determined necessary by my physician: Operation/Procedure name (s) [address] on [address]   2. I recognize that during the surgical operation/procedure, unforeseen conditions may necessitate additional or different procedures than those listed above. I, therefore, further authorize and request that the above-named surgeon, assistants, or designees perform such procedures as are, in their judgment, necessary and desirable. 3.   My surgeon/physician has discussed prior to my surgery the potential benefits, risks and side effects of this procedure; the likelihood of achieving goals; and potential problems that might occur during recuperation. They also discussed reasonable alternatives to the procedure, including risks, benefits, and side effects related to the alternatives and risks related to not receiving this procedure. I have had all my questions answered and I acknowledge that no guarantee has been made as to the result that may be obtained. 4.   Should the need arise during my operation/procedure, which includes change of level of care prior to discharge, I also consent to the administration of blood and/or blood products. Further, I understand that despite careful testing and screening of blood or blood products by collecting agencies, I may still be subject to ill effects as a result of receiving a blood transfusion and/or blood products.   The following are some, but not all, of the potential risks that can occur: fever and allergic reactions, hemolytic reactions, transmission of diseases such as Hepatitis, AIDS and Cytomegalovirus (CMV) and fluid overload. In the event that I wish to have an autologous transfusion of my own blood, or a directed donor transfusion, I will discuss this with my physician. Check only if Refusing Blood or Blood Products  I understand refusal of blood or blood products as deemed necessary by my physician may have serious consequences to my condition to include possible death. I hereby assume responsibility for my refusal and release the hospital, its personnel, and my physicians from any responsibility for the consequences of my refusal.    o  Refuse   5. I authorize the use of any specimen, organs, tissues, body parts or foreign objects that may be removed from my body during the operation/procedure for diagnosis, research or teaching purposes and their subsequent disposal by hospital authorities. I also authorize the release of specimen test results and/or written reports to my treating physician on the hospital medical staff or other referring or consulting physicians involved in my care, at the discretion of the Pathologist or my treating physician. 6.   I consent to the photographing or videotaping of the operations or procedures to be performed, including appropriate portions of my body for medical, scientific, or educational purposes, provided my identity is not revealed by the pictures or by descriptive texts accompanying them. If the procedure has been photographed/videotaped, the surgeon will obtain the original picture, image, videotape or CD. The hospital will not be responsible for storage, release or maintenance of the picture, image, tape or CD.    7.   I consent to the presence of a  or observers in the operating room as deemed necessary by my physician or their designees.     8.   I recognize that in the event my procedure results in extended X-Ray/fluoroscopy time, I may develop a skin reaction. 9. If I have a Do Not Attempt Resuscitation (DNAR) order in place, that status will be suspended while in the operating room, procedural suite, and during the recovery period unless otherwise explicitly stated by me (or a person authorized to consent on my behalf). The surgeon or my attending physician will determine when the applicable recovery period ends for purposes of reinstating the DNAR order. 10. Patients having a sterilization procedure: I understand that if the procedure is successful the results will be permanent and it will therefore be impossible for me to inseminate, conceive, or bear children. I also understand that the procedure is intended to result in sterility, although the result has not been guaranteed. 11. I acknowledge that my physician has explained sedation/analgesia administration to me including the risk and benefits I consent to the administration of sedation/analgesia as may be necessary or desirable in the judgment of my physician. I CERTIFY THAT I HAVE READ AND FULLY UNDERSTAND THE ABOVE CONSENT TO OPERATION and/or OTHER PROCEDURE.     _________________________________________ _________________________________     ___________________________________  Signature of Patient     Signature of Responsible Person                   Printed Name of Responsible Person                              _________________________________________ ______________________________        ___________________________________  Signature of Witness         Date  Time         Relationship to Patient    STATEMENT OF PHYSICIAN My signature below affirms that prior to the time of the procedure; I have explained to the patient and/or his/her legal representative, the risks and benefits involved in the proposed treatment and any reasonable alternative to the proposed treatment.  I have also explained the risks and benefits involved in refusal of the proposed treatment and alternatives to the proposed treatment and have answered the patient's questions.  If I have a significant financial interest in a co-management agreement or a significant financial interest in any product or implant, or other significant relationship used in this procedure/surgery, I have disclosed this and had a discussion with my patient.     _______________________________________________________________ _____________________________  Diane Coke of Physician)                                                                                         (Date)                                   (Time)  Patient Name: Richard Amaya    :    Printed: 10/16/2023      Medical Record #: [id number]                                              Page 1 of 1

## (undated) NOTE — LETTER
OUTSIDE TESTING RESULT REQUEST     IMPORTANT: FOR YOUR IMMEDIATE ATTENTION  Please FAX all test results listed below to: [phone]     Testing already done on or about: 3/3/22    * * * * If testing is NOT complete, arrange with patient A.S.A.P. * * * *      Patient Name: Beaulah Cranker  Surgery Date: 3/24/2022  CSN: [id number]  Medical Record: [id number]   : 1945 - A: 77 y      Sex: female  Surgeon(s):  Rachelle Johnston MD  Procedure: LEFT ANTERIOR TOTAL HIP ARTHROPLASTY   Anesthesia Type: Choice     Surgeon: Rachelle Johnston MD     The following Testing and Time Line are REQUIRED PER ANESTHESIA     EKG READ AND SIGNED WITHIN   90 days  CBC [with Differential & Platelets] within  90 days  CMP (requires 4 hour fast) within  90 days  PT/INR within  30 days  PTT within  30 days  Urine C&S within  30 days  MSSA/MRSA Nasal screening within 30 days      Thank You,   Sent by: Kika Khoury  5/13/15

## (undated) NOTE — LETTER
[facility] Testing Department  Phone: [phone]  OUTSIDE TESTING RESULT REQUEST      TO:   Dr. Yordan Poe Today's Date: 8/17/20    FAX #: [phone]     IMPORTANT: FOR YOUR IMMEDIATE ATTENTION  Please FAX all test results listed below to: [phone]

## (undated) NOTE — IP AVS SNAPSHOT
Patient Demographics     Address  [address] [place] [address] Phone  [phone] [facility]) [phone] (Mobile) E-mail Address  [email or web address]      Emergency Contact(s)     Name Relation Home Work Mobile    Monico Raphael Spouse 6 ? Usually allowed after four to six weeks. Discuss specific work activities with your surgeon. Restrictions  ? For knee replacement surgery, follow instructions provided by physical therapy.   ? Do NOT put a pillow under your knee as it may be more dif physician. ? Review anticoagulant education information sheet provided. Discomfort  ? Surgical discomfort is normal for one to two months. ? Have realistic goals and keep a positive outlook. ?  You may need pain medication regularly (every 4-6 hours) ? An enema or suppository may be needed if above measures do not work. Prevention of infection and promotion of healing  ? Good hand washing is important.  Everyone should wash their hands or use hand  as soon as they walk in your house-whether ? Red streaks on skin near incision. ? Temperature >100.4F.  ? Increased pain at incision not relieved by pain medication. Signs of blood clot  ? Pain, excessive tenderness, redness, or swelling in leg or calf (other than incision site).   (CALL Choose after surgery info.             .  Please seek medical attention if your symptoms do no improve or get worse    Please check blood work (BMP) at the follow up with Dr. Koby Cano MD      Please check blood pressure daily, keep record and bring Take 1,000 mg by mouth once. aspirin 81 MG Tbec      Take 1 tablet (81 mg total) by mouth 2 (two) times daily. Lucian Brunson MD         atorvastatin 20 MG Tabs  Commonly known as:  Lipitor      Take 1 tablet (20 mg total) by mouth daily.    C [id number] aspirin tab 325 mg 09/09/20 0903 Given      [id number] atorvastatin (LIPITOR) tab 20 mg 09/09/20 0903 Given      [id number] ceFAZolin sodium (ANCEF/KEFZOL) 2 GM/20ML premix IV syringe 2 g 09/08/20 2030 Given      [id number] ceFAZolin sodium (ANCEF/ Calcium, Total 8.7 8.5 - 10.1 mg/dL [facility])   Calculated Osmolality 281 275 - 295 mOsm/kg — [facility])   GFR, Non- 67 >=60 — [facility])   GFR, -American 77 >=60 — [facility])            CREATININE, SERUM [ Current Medication:  Current Outpatient Medications   Medication Sig Dispense Refill   • atorvastatin (LIPITOR) 20 MG Oral Tab Take 1 tablet (20 mg total) by mouth daily.  90 tablet 3   • Triamterene-HCTZ (DYAZIDE) 37.5-25 MG Oral Cap Take 1 capsule by mout Comment:rash    REVIEW OF SYSTEMS:   GENERAL: denies fever, change in weight  MUSCULOSKELETAL: no atrophy, no spasticity and as per HPI  SKIN: denies any unusual skin lesions or rashes  NEURO: denies numbness, tingling, or radiating pain  RESPIRATORY: No :  Home Jason MD (Physician)     Consult Orders    1.  Consult to Hospitalist [[id number]] ordered by Jac Andersen MD at 09/08/20 1321              [facility] hospitalist initial consult note    PCP Jayashree Butterfield MD  Consulted at the request of Performed by Marty Conteh MD at [facility]   • HERNIA SURGERY  7/5/11   • HERNIA SURGERY  8/8/12    right ing hernia    • HERNIA SURGERY  6/2013   • HYSTERECTOMY  11/29/2010    JAMES BSO for endometrial cancer   • [address] Intimate partner violence:        Fear of current or ex partner: Not on file        Emotionally abused: Not on file        Physically abused: Not on file        Forced sexual activity: Not on file    Other Topics      Concerns:        Not on file    So [facility] hospitalist  [phone][ID.2]    Electronically signed by Daniel Marrero MD on 9/8/2020 10:22 PM   Attribution Cowan    ID. 1 - Daniel Marrero MD on 9/8/2020  6:22 PM  ID. 2 - Daniel Marrero MD on 9/8/2020 10:19 PM  ID. 3 - Daniel Marrero MD on 9/8/2020 Performed by Anneliese Aburto MD at [address] [place] [address]   • COLONOSCOPY, POSSIBLE BIOPSY, POSSIBLE POLYPECTOMY 40118 N/A 1/28/2010    Performed by Anneliese Aburto MD at [address] [place] [address]   • EGD  09/08/2018    normal, gastric and SB Bx AM-PAC '6-Clicks' INPATIENT SHORT FORM - BASIC MOBILITY  How much difficulty does the patient currently have. ..  -   Turning over in bed (including adjusting bedclothes, sheets and blankets)?: None   -   Sitting down on and standing up from a chair with ar protocol(to perform with rehab aidcachorro Gomez later today), and encouraged to also amb with nsg staff. Pt appears safe for dc home with supportive family.       Pt educated in importance of achieving ROM in a timely fashion, instructed in use of towel roll unde SP.2 Rex Yoder, PT on 9/9/2020 12:26 PM                        Occupational Therapy Notes (last 72 hours) (Notes from 9/6/2020  3:33 PM through 9/9/2020  3:33 PM)      Occupational Therapy Note signed by Inessa Moeller OT at 9/9/2020 11:50 AM  Jose Performed by Anneliese Aburto MD at [address] [place] [address]   • COLONOSCOPY, POSSIBLE BIOPSY, POSSIBLE POLYPECTOMY 61267 N/A 1/28/2010    Performed by Anneliese Aburto MD at [address] [place] [address]   • EGD  09/08/2018    normal, gastric and SB Bx How much help from another person does the patient currently need…  -   Putting on and taking off regular lower body clothing?: A Little   -   Bathing (including washing, rinsing, drying)?: A Little  -   Toileting, which includes using toilet, bedpan or ur Specific performance deficits impacting engagement in ADL/IADL  LOW  1 - 3 performance deficits    Client Assessment/Performance Deficits  LOW - No comorbidities nor modifications of tasks    Clinical Decision Making  LOW - Analysis of occupational profile TD 05/29/19     Zoster Vaccine Live (Zostavax) 10/10/13       Future Appointments        Provider Sara Alicea    9/15/2020 11:45 AM Kathy [facility]errero MD [facility]    9/18/2020 9:45 AM Jaimie Karimi, P

## (undated) NOTE — IP AVS SNAPSHOT
[address]            (For Outpatient Use Only) Initial Admit Date: 3/24/2022   Inpt/Obs Admit Date: Inpt: N/A / Obs: N/A   Discharge Date:    Hospital Acct:  [de-identified]   MRN: [de-identified]   CSN: [id number]   CEID: [id number]        ENCOUNTER  Patient Class: OPIB Admitting Provider: Odette Brown MD Unit: [facility] 3S[facility]   Hospital Service: Ortho/Spine Attending Provider: Odette Brown MD   Bed: 367-A   Visit Type:   Referring Physician: No ref. provider found Billing Flag:    Admit Diagnosis: Primary osteoarthritis of left hip [M16.12]      PATIENT  Legal Name:   Blanca Castaneda   Legal Sex: Female  Gender ID: Female             Pref Name:   Shelley Garcia PCP:  Fortino Meyers MD Home: [phone]   Address:  [place] [address] PASS : 1945 (76 yrs) Mobile: [phone]         City/State/Zip: [facility] Marital:  Language: [address] [place] [address] Drive: [employer] SSN4: [id number] [employer]: Wishes Privacy     Race: White Ethnicity: Non  Or [address] [place] [address] Street   Name Relationship Legal Guardian? Home Phone Work Phone Mobile Phone   1. Monico Raphael  2.  Meeta Carrera  Brother    [phone]       GUARANTOR  Guarantor: Rafat Barriosnner : 1945 Home Phone: [phone]   Address: [address] [place] [address] PASS  Sex: Female Work Phone:    City/State/Zip: [facility]   Rel. to Patient: Self Guarantor ID: [id number]   GUARANTOR EMPLOYER   Employer:  Status: RETIRED     COVERAGE  PRIMARY INSURANCE   PayorPeg Jimenez MA O   Group Number: [id number] Insurance Type: INDEMNITY   Subscriber Name: José Otero : 1945   Subscriber ID: [id number] Pt Rel to Subscriber: Self   SECONDARY INSURANCE   Payor:  Plan:    Group Number:  Insurance Type:    Subscriber Name:  Subscriber :    Subscriber ID:  Pt Rel to Subscriber:    TERTIARY INSURANCE   Payor:  Plan:    Group Number:  Insurance Type: Subscriber Name:  Dante Dietz DOB:    Subscriber ID:  Pt Rel to Subscriber:    Hospital Account Financial Class: Medicare Advantage    2022